# Patient Record
Sex: MALE | Race: WHITE | NOT HISPANIC OR LATINO
[De-identification: names, ages, dates, MRNs, and addresses within clinical notes are randomized per-mention and may not be internally consistent; named-entity substitution may affect disease eponyms.]

---

## 2017-07-24 PROBLEM — Z00.00 ENCOUNTER FOR PREVENTIVE HEALTH EXAMINATION: Status: ACTIVE | Noted: 2017-07-24

## 2017-10-26 ENCOUNTER — APPOINTMENT (OUTPATIENT)
Dept: ENDOCRINOLOGY | Facility: CLINIC | Age: 82
End: 2017-10-26
Payer: MEDICARE

## 2017-10-26 VITALS
DIASTOLIC BLOOD PRESSURE: 67 MMHG | HEART RATE: 68 BPM | TEMPERATURE: 98 F | BODY MASS INDEX: 23.62 KG/M2 | SYSTOLIC BLOOD PRESSURE: 114 MMHG | OXYGEN SATURATION: 100 % | HEIGHT: 77 IN | WEIGHT: 200 LBS

## 2017-10-26 DIAGNOSIS — Z82.0 FAMILY HISTORY OF EPILEPSY AND OTHER DISEASES OF THE NERVOUS SYSTEM: ICD-10-CM

## 2017-10-26 DIAGNOSIS — Z80.8 FAMILY HISTORY OF MALIGNANT NEOPLASM OF OTHER ORGANS OR SYSTEMS: ICD-10-CM

## 2017-10-26 DIAGNOSIS — Z82.49 FAMILY HISTORY OF ISCHEMIC HEART DISEASE AND OTHER DISEASES OF THE CIRCULATORY SYSTEM: ICD-10-CM

## 2017-10-26 DIAGNOSIS — G61.81 CHRONIC INFLAMMATORY DEMYELINATING POLYNEURITIS: ICD-10-CM

## 2017-10-26 DIAGNOSIS — Z87.442 PERSONAL HISTORY OF URINARY CALCULI: ICD-10-CM

## 2017-10-26 DIAGNOSIS — G20 PARKINSON'S DISEASE: ICD-10-CM

## 2017-10-26 DIAGNOSIS — Z80.6 FAMILY HISTORY OF LEUKEMIA: ICD-10-CM

## 2017-10-26 PROCEDURE — 99214 OFFICE O/P EST MOD 30 MIN: CPT

## 2017-10-27 PROBLEM — G61.81 CIDP (CHRONIC INFLAMMATORY DEMYELINATING POLYNEUROPATHY): Status: ACTIVE | Noted: 2017-10-27

## 2017-10-27 PROBLEM — G20 PARKINSON DISEASE, SYMPTOMATIC: Status: ACTIVE | Noted: 2017-10-27

## 2017-10-28 PROBLEM — Z80.6 FAMILY HISTORY OF LEUKEMIA: Status: ACTIVE | Noted: 2017-10-28

## 2017-10-28 PROBLEM — Z87.442 HISTORY OF NEPHROLITHIASIS: Status: RESOLVED | Noted: 2017-10-28 | Resolved: 2017-10-28

## 2017-10-28 PROBLEM — Z82.49 FAMILY HISTORY OF CORONARY ARTERY DISEASE: Status: ACTIVE | Noted: 2017-10-28

## 2017-10-28 PROBLEM — Z82.0 FAMILY HISTORY OF PERIPHERAL NEUROPATHY: Status: ACTIVE | Noted: 2017-10-28

## 2017-10-28 PROBLEM — Z80.8 FAMILY HISTORY OF MALIGNANT MELANOMA: Status: ACTIVE | Noted: 2017-10-28

## 2017-10-28 RX ORDER — CHLORHEXIDINE GLUCONATE 4 %
1000 LIQUID (ML) TOPICAL
Refills: 0 | Status: ACTIVE | COMMUNITY
Start: 2017-10-28

## 2017-10-28 RX ORDER — EPINEPHRINE 0.3 MG/.3ML
0.3 INJECTION INTRAMUSCULAR
Qty: 2 | Refills: 0 | Status: ACTIVE | COMMUNITY
Start: 2017-08-16

## 2017-10-28 RX ORDER — ASPIRIN 81 MG/1
81 TABLET ORAL
Refills: 0 | Status: ACTIVE | COMMUNITY
Start: 2017-10-28

## 2017-10-28 RX ORDER — CARBIDOPA AND LEVODOPA 25; 100 MG/1; MG/1
25-100 TABLET, EXTENDED RELEASE ORAL
Qty: 180 | Refills: 0 | Status: ACTIVE | COMMUNITY
Start: 2017-09-29

## 2018-03-14 ENCOUNTER — APPOINTMENT (OUTPATIENT)
Dept: ENDOCRINOLOGY | Facility: CLINIC | Age: 83
End: 2018-03-14
Payer: MEDICARE

## 2018-03-14 VITALS
BODY MASS INDEX: 24 KG/M2 | TEMPERATURE: 97.9 F | HEIGHT: 75 IN | WEIGHT: 193 LBS | DIASTOLIC BLOOD PRESSURE: 68 MMHG | HEART RATE: 81 BPM | OXYGEN SATURATION: 100 % | SYSTOLIC BLOOD PRESSURE: 125 MMHG

## 2018-03-14 DIAGNOSIS — G47.00 INSOMNIA, UNSPECIFIED: ICD-10-CM

## 2018-03-14 PROCEDURE — 99214 OFFICE O/P EST MOD 30 MIN: CPT

## 2018-03-25 ENCOUNTER — EMERGENCY (EMERGENCY)
Facility: HOSPITAL | Age: 83
LOS: 1 days | Discharge: HOME | End: 2018-03-25
Attending: EMERGENCY MEDICINE | Admitting: EMERGENCY MEDICINE
Payer: MEDICARE

## 2018-03-25 VITALS
DIASTOLIC BLOOD PRESSURE: 81 MMHG | SYSTOLIC BLOOD PRESSURE: 153 MMHG | TEMPERATURE: 98 F | RESPIRATION RATE: 16 BRPM | HEART RATE: 82 BPM | OXYGEN SATURATION: 98 % | WEIGHT: 190.04 LBS

## 2018-03-25 DIAGNOSIS — M54.41 LUMBAGO WITH SCIATICA, RIGHT SIDE: ICD-10-CM

## 2018-03-25 DIAGNOSIS — M54.42 LUMBAGO WITH SCIATICA, LEFT SIDE: ICD-10-CM

## 2018-03-25 DIAGNOSIS — I10 ESSENTIAL (PRIMARY) HYPERTENSION: ICD-10-CM

## 2018-03-25 LAB
APPEARANCE UR: CLEAR — SIGNIFICANT CHANGE UP
BILIRUB UR-MCNC: NEGATIVE — SIGNIFICANT CHANGE UP
COLOR SPEC: YELLOW — SIGNIFICANT CHANGE UP
DIFF PNL FLD: (no result)
GLUCOSE UR QL: NEGATIVE — SIGNIFICANT CHANGE UP
KETONES UR-MCNC: NEGATIVE — SIGNIFICANT CHANGE UP
LEUKOCYTE ESTERASE UR-ACNC: NEGATIVE — SIGNIFICANT CHANGE UP
NITRITE UR-MCNC: NEGATIVE — SIGNIFICANT CHANGE UP
PH UR: 6 — SIGNIFICANT CHANGE UP (ref 5–8)
PROT UR-MCNC: NEGATIVE MG/DL — SIGNIFICANT CHANGE UP
SP GR SPEC: >=1.03 — SIGNIFICANT CHANGE UP (ref 1–1.03)
UROBILINOGEN FLD QL: 0.2 E.U./DL — SIGNIFICANT CHANGE UP

## 2018-03-25 PROCEDURE — 99284 EMERGENCY DEPT VISIT MOD MDM: CPT

## 2018-03-25 PROCEDURE — 74176 CT ABD & PELVIS W/O CONTRAST: CPT | Mod: 26

## 2018-03-25 RX ORDER — KETOROLAC TROMETHAMINE 30 MG/ML
15 SYRINGE (ML) INJECTION ONCE
Qty: 0 | Refills: 0 | Status: DISCONTINUED | OUTPATIENT
Start: 2018-03-25 | End: 2018-03-25

## 2018-03-25 RX ORDER — KETOROLAC TROMETHAMINE 30 MG/ML
1 SYRINGE (ML) INJECTION
Qty: 6 | Refills: 0 | OUTPATIENT
Start: 2018-03-25 | End: 2018-03-27

## 2018-03-25 RX ADMIN — Medication 15 MILLIGRAM(S): at 11:34

## 2018-03-25 NOTE — ED PROVIDER NOTE - CHPI ED SYMPTOMS NEG
no difficulty bearing weight/no motor function loss/no numbness/no bowel dysfunction/no tingling/no bladder dysfunction

## 2018-03-25 NOTE — ED PROVIDER NOTE - OBJECTIVE STATEMENT
83 y/o M w/ PMH Parkinson's disease, neuropathy of BLE w/ Immune Globulin Injection 10% x2 weeks, HTN, who presents to the ED w/ bilateral lower back pain x1 week that occurred while driving. Pain is worse w/ sitting and improves w/ walking. He was seen by Chiropractor twice w/ adjustments without relief. He has also applied OTC pain patch and taken Aleve w/o relief. Denies fever, chills, trauma/injury/fall, abdominal pain, nausea, vomiting, bowel/bladder incontinence, weakness in lower extremities, numbness, tingling, saddle anesthesia. H/o back pain in the past.     Med list:   Immune Globulin injecion 10 %x2 weeks  ASA 81 QD   Caridopa/levodopa  TID  Folic acid 400 QD  Cod liver oil 250 QD  multi-vitamin QD  B-12 1000 QD  [id number]/12.5 QD  Trazodone 50 BID  Miralax

## 2018-03-25 NOTE — ED ADULT TRIAGE NOTE - CHIEF COMPLAINT QUOTE
Patient c/o localized lower back pain since last week. Has gone to chiropractor, taken Aleve (last dose 8pm last night), OTC patches without relief. Hx of Parkinsons and neuropathy. Denies injury/falls, lifting heavy object, loss of urinary/bowel function.

## 2018-05-24 ENCOUNTER — EMERGENCY (EMERGENCY)
Facility: HOSPITAL | Age: 83
LOS: 1 days | Discharge: HOME | End: 2018-05-24
Admitting: EMERGENCY MEDICINE
Payer: MEDICARE

## 2018-05-24 VITALS
SYSTOLIC BLOOD PRESSURE: 159 MMHG | OXYGEN SATURATION: 98 % | TEMPERATURE: 98 F | DIASTOLIC BLOOD PRESSURE: 83 MMHG | HEART RATE: 89 BPM | RESPIRATION RATE: 17 BRPM

## 2018-05-24 DIAGNOSIS — H26.40 UNSPECIFIED SECONDARY CATARACT: Chronic | ICD-10-CM

## 2018-05-24 LAB
ALBUMIN SERPL ELPH-MCNC: 3.3 G/DL — LOW (ref 3.4–5)
ALP SERPL-CCNC: 63 U/L — SIGNIFICANT CHANGE UP (ref 40–120)
ALT FLD-CCNC: 12 U/L — SIGNIFICANT CHANGE UP (ref 12–42)
ANION GAP SERPL CALC-SCNC: 6 MMOL/L — LOW (ref 9–16)
AST SERPL-CCNC: 19 U/L — SIGNIFICANT CHANGE UP (ref 15–37)
BASOPHILS NFR BLD AUTO: 0.3 % — SIGNIFICANT CHANGE UP (ref 0–2)
BILIRUB SERPL-MCNC: 0.3 MG/DL — SIGNIFICANT CHANGE UP (ref 0.2–1.2)
BUN SERPL-MCNC: 22 MG/DL — SIGNIFICANT CHANGE UP (ref 7–23)
CALCIUM SERPL-MCNC: 8.8 MG/DL — SIGNIFICANT CHANGE UP (ref 8.5–10.5)
CHLORIDE SERPL-SCNC: 105 MMOL/L — SIGNIFICANT CHANGE UP (ref 96–108)
CO2 SERPL-SCNC: 31 MMOL/L — SIGNIFICANT CHANGE UP (ref 22–31)
CREAT SERPL-MCNC: 1.3 MG/DL — SIGNIFICANT CHANGE UP (ref 0.5–1.3)
EOSINOPHIL NFR BLD AUTO: 2.2 % — SIGNIFICANT CHANGE UP (ref 0–6)
GLUCOSE SERPL-MCNC: 160 MG/DL — HIGH (ref 70–99)
HCT VFR BLD CALC: 32.9 % — LOW (ref 39–50)
HGB BLD-MCNC: 10.7 G/DL — LOW (ref 13–17)
IMM GRANULOCYTES NFR BLD AUTO: 0.3 % — SIGNIFICANT CHANGE UP (ref 0–1.5)
LYMPHOCYTES # BLD AUTO: 18.5 % — SIGNIFICANT CHANGE UP (ref 13–44)
MCHC RBC-ENTMCNC: 29.9 PG — SIGNIFICANT CHANGE UP (ref 27–34)
MCHC RBC-ENTMCNC: 32.5 G/DL — SIGNIFICANT CHANGE UP (ref 32–36)
MCV RBC AUTO: 91.9 FL — SIGNIFICANT CHANGE UP (ref 80–100)
MONOCYTES NFR BLD AUTO: 10.2 % — SIGNIFICANT CHANGE UP (ref 2–14)
NEUTROPHILS NFR BLD AUTO: 68.5 % — SIGNIFICANT CHANGE UP (ref 43–77)
PLATELET # BLD AUTO: 130 K/UL — LOW (ref 150–400)
POTASSIUM SERPL-MCNC: 3.7 MMOL/L — SIGNIFICANT CHANGE UP (ref 3.5–5.3)
POTASSIUM SERPL-SCNC: 3.7 MMOL/L — SIGNIFICANT CHANGE UP (ref 3.5–5.3)
PROT SERPL-MCNC: 8.7 G/DL — HIGH (ref 6.4–8.2)
RBC # BLD: 3.58 M/UL — LOW (ref 4.2–5.8)
RBC # FLD: 13.2 % — SIGNIFICANT CHANGE UP (ref 10.3–16.9)
SODIUM SERPL-SCNC: 142 MMOL/L — SIGNIFICANT CHANGE UP (ref 132–145)
TROPONIN I SERPL-MCNC: 0.02 NG/ML — SIGNIFICANT CHANGE UP (ref 0.02–0.06)
WBC # BLD: 3.6 K/UL — LOW (ref 3.8–10.5)
WBC # FLD AUTO: 3.6 K/UL — LOW (ref 3.8–10.5)

## 2018-05-24 PROCEDURE — 93010 ELECTROCARDIOGRAM REPORT: CPT

## 2018-05-24 PROCEDURE — 71046 X-RAY EXAM CHEST 2 VIEWS: CPT | Mod: 26

## 2018-05-24 PROCEDURE — 99285 EMERGENCY DEPT VISIT HI MDM: CPT | Mod: 25

## 2018-05-24 NOTE — ED ADULT TRIAGE NOTE - CHIEF COMPLAINT QUOTE
patient here with dizziness and lightheadedness for "the past couple of days"; ' I feel like I have tunnel vision sometimes uptight/anxious"; worsening today and yesterday; no new medicines; took trazodone 50mg 30 minutes before coming to ED; hx Parkinson's disease

## 2018-05-24 NOTE — ED ADULT NURSE NOTE - CHPI ED SYMPTOMS NEG
no cough/no shortness of breath/no vomiting/no chest pain/no nausea/no diaphoresis/no back pain/no chills/no fever/no syncope/no dizziness

## 2018-05-24 NOTE — ED ADULT NURSE NOTE - OBJECTIVE STATEMENT
84 yo M c.o dizziness and "not right feeling" since AM. Pt states "at 930 I had the infusion for my parkinson's and have felt uptight since.  This is the 70th treatment that I have had.  I felt like I needed to get out of the apartment so we went outside to finish it. I finished the infusion around 4pm but have felt lousy since. I recently was worked up for cancer but they told me I didn't have it."  Pt is A&Ox3 at this time and states "my sweetheart thinks its anxiety but I don't know". Pt stable at this time and will continue to monitor.

## 2018-05-25 VITALS
DIASTOLIC BLOOD PRESSURE: 73 MMHG | RESPIRATION RATE: 16 BRPM | SYSTOLIC BLOOD PRESSURE: 134 MMHG | HEART RATE: 76 BPM | TEMPERATURE: 98 F | OXYGEN SATURATION: 98 %

## 2018-05-25 RX ORDER — SODIUM CHLORIDE 9 MG/ML
1000 INJECTION INTRAMUSCULAR; INTRAVENOUS; SUBCUTANEOUS ONCE
Qty: 0 | Refills: 0 | Status: COMPLETED | OUTPATIENT
Start: 2018-05-25 | End: 2018-05-25

## 2018-05-25 RX ADMIN — SODIUM CHLORIDE 1000 MILLILITER(S): 9 INJECTION INTRAMUSCULAR; INTRAVENOUS; SUBCUTANEOUS at 00:39

## 2018-05-25 NOTE — ED PROVIDER NOTE - OBJECTIVE STATEMENT
82yo M with h/o Parkinsons, anemia, renal cysts, HTN presents with fatigue and feeling "claustrophobic" all day, possibly as a result of his elevator being broken and being stuck in the apartment all day, per pt. Pt also describes intermittent episodes of lightheadedness and blurry vision when standing up from sleeping in the morning, which has been ongoing x several weeks. pt evaluated 2 weeks ago for r/o cancer work up with negative results. pt also had L cataract surgery 2 weeks ago. Pt denies chest pain, palpitations, headache, current vision changes, neck pain, back pain, nausea, vomiting, abd pain, fever, chills, cough, any other symptoms. symptoms have been constant today.

## 2018-05-25 NOTE — ED PROVIDER NOTE - MEDICAL DECISION MAKING DETAILS
84yo M h/o parkinsons presents with fatigue and "claustrophobia" all day today. labs nonacute. CXR nonacute. EKG unremarkable. pt refused head CT, discussed risk of bleeding or stroke as cause of symptoms but pt had recent scan 2 weeks ago and has not fallen since then. feeling improved after fluids. requesting discharge.

## 2018-05-28 DIAGNOSIS — R42 DIZZINESS AND GIDDINESS: ICD-10-CM

## 2018-05-28 DIAGNOSIS — Z79.899 OTHER LONG TERM (CURRENT) DRUG THERAPY: ICD-10-CM

## 2018-05-28 DIAGNOSIS — R53.83 OTHER FATIGUE: ICD-10-CM

## 2018-05-28 DIAGNOSIS — I10 ESSENTIAL (PRIMARY) HYPERTENSION: ICD-10-CM

## 2018-07-12 ENCOUNTER — MEDICATION RENEWAL (OUTPATIENT)
Age: 83
End: 2018-07-12

## 2018-08-22 ENCOUNTER — APPOINTMENT (OUTPATIENT)
Dept: ENDOCRINOLOGY | Facility: CLINIC | Age: 83
End: 2018-08-22
Payer: MEDICARE

## 2018-08-22 VITALS
WEIGHT: 182 LBS | OXYGEN SATURATION: 98 % | TEMPERATURE: 97.9 F | DIASTOLIC BLOOD PRESSURE: 69 MMHG | HEIGHT: 75 IN | BODY MASS INDEX: 22.63 KG/M2 | HEART RATE: 91 BPM | SYSTOLIC BLOOD PRESSURE: 111 MMHG

## 2018-08-22 PROBLEM — I10 ESSENTIAL (PRIMARY) HYPERTENSION: Chronic | Status: ACTIVE | Noted: 2018-03-25

## 2018-08-22 PROBLEM — G20 PARKINSON'S DISEASE: Chronic | Status: ACTIVE | Noted: 2018-03-25

## 2018-08-22 PROBLEM — G57.90 UNSPECIFIED MONONEUROPATHY OF UNSPECIFIED LOWER LIMB: Chronic | Status: ACTIVE | Noted: 2018-03-25

## 2018-08-22 PROCEDURE — 99214 OFFICE O/P EST MOD 30 MIN: CPT

## 2018-09-10 ENCOUNTER — EMERGENCY (EMERGENCY)
Facility: HOSPITAL | Age: 83
LOS: 1 days | Discharge: HOME | End: 2018-09-10
Admitting: EMERGENCY MEDICINE
Payer: MEDICARE

## 2018-09-10 VITALS
TEMPERATURE: 98 F | DIASTOLIC BLOOD PRESSURE: 75 MMHG | HEART RATE: 95 BPM | SYSTOLIC BLOOD PRESSURE: 137 MMHG | OXYGEN SATURATION: 98 % | RESPIRATION RATE: 16 BRPM

## 2018-09-10 DIAGNOSIS — Z87.891 PERSONAL HISTORY OF NICOTINE DEPENDENCE: ICD-10-CM

## 2018-09-10 DIAGNOSIS — N39.0 URINARY TRACT INFECTION, SITE NOT SPECIFIED: ICD-10-CM

## 2018-09-10 DIAGNOSIS — Z79.899 OTHER LONG TERM (CURRENT) DRUG THERAPY: ICD-10-CM

## 2018-09-10 DIAGNOSIS — I10 ESSENTIAL (PRIMARY) HYPERTENSION: ICD-10-CM

## 2018-09-10 DIAGNOSIS — Z79.82 LONG TERM (CURRENT) USE OF ASPIRIN: ICD-10-CM

## 2018-09-10 DIAGNOSIS — H26.40 UNSPECIFIED SECONDARY CATARACT: Chronic | ICD-10-CM

## 2018-09-10 DIAGNOSIS — R35.0 FREQUENCY OF MICTURITION: ICD-10-CM

## 2018-09-10 LAB
APPEARANCE UR: CLEAR — SIGNIFICANT CHANGE UP
BILIRUB UR-MCNC: NEGATIVE — SIGNIFICANT CHANGE UP
COLOR SPEC: YELLOW — SIGNIFICANT CHANGE UP
DIFF PNL FLD: ABNORMAL
GLUCOSE UR QL: NEGATIVE — SIGNIFICANT CHANGE UP
KETONES UR-MCNC: NEGATIVE — SIGNIFICANT CHANGE UP
LEUKOCYTE ESTERASE UR-ACNC: ABNORMAL
NITRITE UR-MCNC: NEGATIVE — SIGNIFICANT CHANGE UP
PH UR: 5.5 — SIGNIFICANT CHANGE UP (ref 5–8)
PROT UR-MCNC: NEGATIVE MG/DL — SIGNIFICANT CHANGE UP
SP GR SPEC: 1.02 — SIGNIFICANT CHANGE UP (ref 1–1.03)
UROBILINOGEN FLD QL: 0.2 E.U./DL — SIGNIFICANT CHANGE UP

## 2018-09-10 PROCEDURE — 99283 EMERGENCY DEPT VISIT LOW MDM: CPT

## 2018-09-10 RX ORDER — PHENAZOPYRIDINE HCL 100 MG
200 TABLET ORAL ONCE
Qty: 0 | Refills: 0 | Status: COMPLETED | OUTPATIENT
Start: 2018-09-10 | End: 2018-09-10

## 2018-09-10 RX ORDER — PHENAZOPYRIDINE HCL 100 MG
1 TABLET ORAL
Qty: 6 | Refills: 0
Start: 2018-09-10 | End: 2018-09-11

## 2018-09-10 RX ORDER — CEFUROXIME AXETIL 250 MG
1 TABLET ORAL
Qty: 14 | Refills: 0 | OUTPATIENT
Start: 2018-09-10 | End: 2018-09-16

## 2018-09-10 RX ORDER — CEFUROXIME AXETIL 250 MG
250 TABLET ORAL ONCE
Qty: 0 | Refills: 0 | Status: COMPLETED | OUTPATIENT
Start: 2018-09-10 | End: 2018-09-10

## 2018-09-10 RX ORDER — CEFUROXIME AXETIL 250 MG
1 TABLET ORAL
Qty: 14 | Refills: 0
Start: 2018-09-10 | End: 2018-09-16

## 2018-09-10 RX ADMIN — Medication 200 MILLIGRAM(S): at 19:26

## 2018-09-10 RX ADMIN — Medication 250 MILLIGRAM(S): at 19:26

## 2018-09-10 NOTE — ED PROVIDER NOTE - MEDICAL DECISION MAKING DETAILS
AFVSS at time of d/c, pt non-toxic appearing, results, ddx, and f/u plans discussed with pt at bedside, d/c'd home to f/u with PMD, strict return precautions discussed, prompt return to ER for any worsening or new sx, pt verbalized understanding. uncomplicated UTI on exam and U/A, AFVSS and non-toxic appearing, no systemic sx noted, results, ddx, and f/u plans discussed with pt at bedside, d/c'd home on course of ceftin and prompt f/u with PMD and urology, strict return precautions discussed, prompt return to ER for any worsening or new sx, pt verbalized understanding.

## 2018-09-10 NOTE — ED PROVIDER NOTE - OBJECTIVE STATEMENT
82 yo M with 82 yo M with PMHx of HTN, parkinson's dz, and neuropathy, 84 yo M with PMHx of HTN, parkinson's dz, and neuropathy, presenting c/o urinary frequency and hesitancy x 1 month, seen by PMD and endocrinologist s/p outpt labs and U/A with trace damaris and unremarkable findings 1 month ago. 84 yo M with PMHx of HTN, parkinson's dz, and neuropathy, presenting c/o urinary frequency and hesitancy x 1 month, seen by PMD and endocrinologist s/p outpt labs and U/A with trace damaris and unremarkable lab findings 1 month ago. Noted worsening sx 2d ago with slight urinary leakage and dysuria today. Denies fever, chills, hematuria, penile d/c, abdominal pain, change in bowel function, flank pain, rash, HA, dizziness, SOB, CP, palpitations, diaphoresis, cough, and malaise.

## 2018-09-10 NOTE — ED PROVIDER NOTE - PHYSICAL EXAMINATION
Gen - WDWN elderly M, NAD, comfortable and non-toxic appearing  Skin - warm, dry, intact   HEENT - AT/NC, airway patent, neck supple   CV - S1S2, R/R/R  Resp - CTAB, no r/r/w  GI - soft, ND, NT, no CVAT b/l   MS - w/w/p, no c/c/e  Neuro - AxOx3, +essential tremors, ambulatory with shuffling gait

## 2018-09-11 LAB
CULTURE RESULTS: SIGNIFICANT CHANGE UP
SPECIMEN SOURCE: SIGNIFICANT CHANGE UP

## 2018-09-11 RX ORDER — SACCHAROMYCES BOULARDII 250 MG
1 POWDER IN PACKET (EA) ORAL
Qty: 30 | Refills: 0
Start: 2018-09-11 | End: 2018-09-25

## 2018-09-21 ENCOUNTER — EMERGENCY (EMERGENCY)
Facility: HOSPITAL | Age: 83
LOS: 1 days | Discharge: HOME | End: 2018-09-21
Admitting: EMERGENCY MEDICINE
Payer: MEDICARE

## 2018-09-21 VITALS
TEMPERATURE: 98 F | DIASTOLIC BLOOD PRESSURE: 68 MMHG | RESPIRATION RATE: 16 BRPM | HEART RATE: 91 BPM | OXYGEN SATURATION: 94 % | SYSTOLIC BLOOD PRESSURE: 148 MMHG

## 2018-09-21 DIAGNOSIS — H26.40 UNSPECIFIED SECONDARY CATARACT: Chronic | ICD-10-CM

## 2018-09-21 PROCEDURE — 99282 EMERGENCY DEPT VISIT SF MDM: CPT

## 2018-09-21 NOTE — ED PROVIDER NOTE - OBJECTIVE STATEMENT
83 y.o M with PMHx of Parkinson Dz on Levodopa, presents to the ED with c/o sore throat after swallowing his meds. Pt states he cuts up his pill into 4 pieces and takes 2 pieces each. Took his medication around 2PM. First 2 pieces went down fine, but the other 2 got caught in his throat. Was able to spit it back up. 83 y.o M with PMHx of Parkinson disease on Levodopa, presents to the ED with c/o sore throat after swallowing his medications today. Pt states he cuts up his levodopa pill into 4 pieces, swallows 2 pieces at a time due to dysphagia from Parkinsons. Took his medications around 3 hours ago - he swallowed the first 2 pieces without difficulty , but the subsequent 2 got "caught" in his throat - he was able to spit it back up, c/o soreness to jaleel's apple. no drooling, no dyspnea.

## 2018-09-21 NOTE — ED ADULT NURSE NOTE - NSIMPLEMENTINTERV_GEN_ALL_ED
Implemented All Universal Safety Interventions:  [place] to call system. Call bell, personal items and telephone within reach. Instruct patient to call for assistance. Room bathroom lighting operational. Non-slip footwear when patient is off stretcher. Physically safe environment: no spills, clutter or unnecessary equipment. Stretcher in lowest position, wheels locked, appropriate side rails in place.

## 2018-09-21 NOTE — ED ADULT TRIAGE NOTE - CHIEF COMPLAINT QUOTE
Patient with h/o Parkinson dx, patient complaining of throat irration after a pill got stuck in his throat

## 2018-09-21 NOTE — ED PROVIDER NOTE - ENMT, MLM
Airway patent, Nasal mucosa clear. Mouth with normal mucosa. Throat has no vesicles, no oropharyngeal exudates and uvula is midline. No drooling. no stridor. Drinking fluids and eating crackers with no difficulty. Airway patent, Mouth with normal mucosa, no fb. Throat has no vesicles, no oropharyngeal exudates and uvula is midline. No drooling. no stridor. Drinking fluids and eating crackers with no difficulty.

## 2018-09-21 NOTE — ED PROVIDER NOTE - MEDICAL DECISION MAKING DETAILS
sore throat after taking his medications today and spitting them up, c/o sore throat, no fb sensation, no signs of airway compromise or distress on exam, tolerating PO and drinking fluids, normal vitals, return precautions discussed, will dc

## 2018-09-21 NOTE — ED ADULT NURSE NOTE - OBJECTIVE STATEMENT
Pt c/o throat discomfort after he regurgitated a pill he was taking. Breathing even and unlabored. Speech clear

## 2018-09-25 DIAGNOSIS — J02.9 ACUTE PHARYNGITIS, UNSPECIFIED: ICD-10-CM

## 2018-09-25 DIAGNOSIS — Z79.899 OTHER LONG TERM (CURRENT) DRUG THERAPY: ICD-10-CM

## 2018-09-25 DIAGNOSIS — R07.0 PAIN IN THROAT: ICD-10-CM

## 2018-09-25 DIAGNOSIS — I10 ESSENTIAL (PRIMARY) HYPERTENSION: ICD-10-CM

## 2018-09-25 DIAGNOSIS — Z79.82 LONG TERM (CURRENT) USE OF ASPIRIN: ICD-10-CM

## 2018-11-25 ENCOUNTER — EMERGENCY (EMERGENCY)
Facility: HOSPITAL | Age: 83
LOS: 1 days | Discharge: HOME | End: 2018-11-25
Admitting: EMERGENCY MEDICINE
Payer: MEDICARE

## 2018-11-25 VITALS
DIASTOLIC BLOOD PRESSURE: 79 MMHG | TEMPERATURE: 98 F | OXYGEN SATURATION: 98 % | RESPIRATION RATE: 20 BRPM | SYSTOLIC BLOOD PRESSURE: 138 MMHG | HEART RATE: 89 BPM

## 2018-11-25 DIAGNOSIS — H26.40 UNSPECIFIED SECONDARY CATARACT: Chronic | ICD-10-CM

## 2018-11-25 PROCEDURE — 72100 X-RAY EXAM L-S SPINE 2/3 VWS: CPT | Mod: 26

## 2018-11-25 PROCEDURE — 99283 EMERGENCY DEPT VISIT LOW MDM: CPT

## 2018-11-25 RX ORDER — OXYCODONE AND ACETAMINOPHEN 5; 325 MG/1; MG/1
1 TABLET ORAL ONCE
Qty: 0 | Refills: 0 | Status: DISCONTINUED | OUTPATIENT
Start: 2018-11-25 | End: 2018-11-25

## 2018-11-25 RX ORDER — TRAMADOL HYDROCHLORIDE 50 MG/1
1 TABLET ORAL
Qty: 24 | Refills: 0
Start: 2018-11-25 | End: 2018-11-28

## 2018-11-25 RX ORDER — IBUPROFEN 200 MG
600 TABLET ORAL ONCE
Qty: 0 | Refills: 0 | Status: COMPLETED | OUTPATIENT
Start: 2018-11-25 | End: 2018-11-25

## 2018-11-25 RX ADMIN — OXYCODONE AND ACETAMINOPHEN 1 TABLET(S): 5; 325 TABLET ORAL at 16:51

## 2018-11-25 RX ADMIN — OXYCODONE AND ACETAMINOPHEN 1 TABLET(S): 5; 325 TABLET ORAL at 18:39

## 2018-11-25 RX ADMIN — Medication 600 MILLIGRAM(S): at 18:39

## 2018-11-25 RX ADMIN — Medication 600 MILLIGRAM(S): at 16:50

## 2018-11-25 NOTE — ED PROVIDER NOTE - OBJECTIVE STATEMENT
84 y/o male with PMHx of Parkinson's (taking carbidopa), and peripheral neuropathy with NKDA presents to the ED with complaints of lower back pain today. Pt states he has had this pain in the past and typically will see his chiropractor which helps diminishes the pain. Pt visited his chiropractor last week twice. He has taken Advil PM for the pain but with no relief. He has difficulty walking secondary to pain. Pt denies any injury or trauma to lower back. No chest pain, no SOB, no difficulty urinating, no lower extremity weakness, and no bladder dysfunction.

## 2018-11-25 NOTE — ED ADULT NURSE NOTE - NSIMPLEMENTINTERV_GEN_ALL_ED
Implemented All Universal Safety Interventions:  [place] to call system. Call bell, personal items and telephone within reach. Instruct patient to call for assistance. Room bathroom lighting operational. Non-slip footwear when patient is off stretcher. Physically safe environment: no spills, clutter or unnecessary equipment. Stretcher in lowest position, wheels locked, appropriate side rails in place.

## 2018-11-25 NOTE — ED PROVIDER NOTE - CARE PROVIDER_API CALL
Ian Macias), Orthopaedic Surgery  [address]  [address] [place] [address]  Phone: [phone]  Fax: [phone]

## 2018-11-25 NOTE — ED ADULT TRIAGE NOTE - CHIEF COMPLAINT QUOTE
ambulates into ed complaining of back pain.  has history of chronic back pain, but states that this pain is more intense and restricts movement.

## 2018-11-29 DIAGNOSIS — M54.5 LOW BACK PAIN: ICD-10-CM

## 2018-11-29 DIAGNOSIS — I10 ESSENTIAL (PRIMARY) HYPERTENSION: ICD-10-CM

## 2018-11-29 DIAGNOSIS — Z79.82 LONG TERM (CURRENT) USE OF ASPIRIN: ICD-10-CM

## 2018-11-29 DIAGNOSIS — G89.29 OTHER CHRONIC PAIN: ICD-10-CM

## 2018-11-29 DIAGNOSIS — Z79.899 OTHER LONG TERM (CURRENT) DRUG THERAPY: ICD-10-CM

## 2018-12-19 ENCOUNTER — APPOINTMENT (OUTPATIENT)
Dept: ENDOCRINOLOGY | Facility: CLINIC | Age: 83
End: 2018-12-19
Payer: MEDICARE

## 2018-12-19 VITALS
WEIGHT: 175 LBS | HEIGHT: 75 IN | BODY MASS INDEX: 21.76 KG/M2 | HEART RATE: 68 BPM | DIASTOLIC BLOOD PRESSURE: 74 MMHG | OXYGEN SATURATION: 100 % | SYSTOLIC BLOOD PRESSURE: 127 MMHG

## 2018-12-19 DIAGNOSIS — F41.9 ANXIETY DISORDER, UNSPECIFIED: ICD-10-CM

## 2018-12-19 PROCEDURE — 99214 OFFICE O/P EST MOD 30 MIN: CPT

## 2018-12-19 RX ORDER — ESCITALOPRAM OXALATE 10 MG/1
10 TABLET ORAL DAILY
Qty: 90 | Refills: 3 | Status: ACTIVE | COMMUNITY
Start: 2018-12-19

## 2018-12-19 RX ORDER — IMMUNE GLOBULIN (HUMAN) 10 G/100ML
10 INJECTION INTRAVENOUS; SUBCUTANEOUS
Refills: 0 | Status: ACTIVE | COMMUNITY
Start: 2017-10-28

## 2018-12-20 PROBLEM — F41.9 ANXIETY DISORDER: Status: ACTIVE | Noted: 2018-08-24

## 2019-04-15 NOTE — ED ADULT NURSE NOTE - FALL HARM RISK TYPE OF ASSESSMENT
Spoke to patient, advised stress test results not available yet. Advised will advise of stress test results and if Dr. Mccarthy has cleared him to give blood once reviewed with Dr. Mccarthy. He verbalized understanding and agreement to all     Admission

## 2019-04-18 ENCOUNTER — APPOINTMENT (OUTPATIENT)
Dept: ENDOCRINOLOGY | Facility: CLINIC | Age: 84
End: 2019-04-18
Payer: MEDICARE

## 2019-04-18 VITALS
OXYGEN SATURATION: 99 % | HEIGHT: 75 IN | SYSTOLIC BLOOD PRESSURE: 115 MMHG | BODY MASS INDEX: 24.37 KG/M2 | HEART RATE: 76 BPM | TEMPERATURE: 97.8 F | WEIGHT: 196 LBS | DIASTOLIC BLOOD PRESSURE: 63 MMHG

## 2019-04-18 PROCEDURE — 99214 OFFICE O/P EST MOD 30 MIN: CPT

## 2019-04-18 RX ORDER — CARBIDOPA AND LEVODOPA 25; 100 MG/1; MG/1
25-100 TABLET ORAL 3 TIMES DAILY
Refills: 0 | Status: ACTIVE | COMMUNITY
Start: 2017-04-30

## 2019-04-18 RX ORDER — ALPRAZOLAM 0.5 MG/1
0.5 TABLET ORAL
Refills: 0 | Status: ACTIVE | COMMUNITY
Start: 2018-12-19

## 2019-04-19 NOTE — HISTORY OF PRESENT ILLNESS
[FreeTextEntry1] : 84-year-old man who is followed for type 2 DM and hypertension.  His diabetes was diagnosed in 2016 when his A1c level (which was being followed because of pre-diabetes) christoph to 6.7%.  HIs glycemic control has been satisfactory on dietary modification alone.  He is also followed for a non-toxic multinodular goiter (with the dominant nodules having been negative on biopsy) and hypercholesterolemia (previously but not currently on statin therapy).  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 Saw Dr. Ricardo last month, strength (on static testing) was apparently excellent\par --Sinemet was increased to 2 tablets 3X/day at the visit to his Parkinsons physician yesterday, mostly because of worsening tremor\par --Has fallen twice in the street--says that on each occasion his leg "gave out"\par --Will be starting PT at [facility]\par Has not been testing any fingersticks, but tests done by the infusion nurse (usually fasting) have been < 110 \alexandrea Has gained back 20 lb in weight, but cannot say what brought his appetite back--seems that he decided that he "needed to eat."\par --Breakfast is either cold cereal or toast\par --Has half a sandwich or crackers/cheese for lunch, usually with a can of Ensure\par --Supper is pasta 5 nights/week, usually with chicken and vegetables.\par --Having cookies or a cupcake at night\par --Has been drinking OJ at night (because of dry mouth)\alexandrea Had a cataract extraction in the R eye in February.  (Left eye had been done in November)  Visual acuity is markedly better\alexandrea Has almost no paresthesias or neuropathic pain in his feet\alexandrea Had cataract extr

## 2019-04-19 NOTE — ASSESSMENT
[FreeTextEntry1] : 1) Type 2 DM:  Glycemic control is excellent by A1c level, and his control has not "suffered" from his having gained back the 20 lb of weight which he had lost.\par --Has not been doing fingersticks as previously encouraged, but is clearly resistant to doing this.  With the A1c level this low, will not "push the issue" at this point.\par --Diet is acceptable despite the lapses at night with what is likely excessive pasta at supper and the baked goods after supper.  Will encourage him to eliminate the latter at whatever point his A1c levels begin to rise.  \par Also suggested that he switch his supplement from Ensure to Glucerna (which is sugar-free)\par \par 2) Hypercholesterolemia:  LDL-cholesterol remains below his target of 70 mg% without statin therapy.\par --Though guidelines suggest statins for all diabetics regardless of lipid levels, will hold off for now given his multiple medications\par \par 3) Hypertension:  BP is excellent on today's exam and on the readings checked by his infusion nurses at home.\par --continue the half tablet of lisinopril/HCTZ\par \par 4) Multinodular goiter:  Stable by palpation.  Biopsies of the clinically significant nodules in the past were negative.  Will need to find out where his last ultrasounds were done before sending him for a follow-up study.\par \par See for follow-up in 4 months.  CMP, lipids, A1c, 25-D, microalb and TFTs before the visit [FreeTextEntry2] : Diet

## 2019-04-19 NOTE — REVIEW OF SYSTEMS
[Dry Skin] : dry skin [Poor Balance] : poor balance [Difficulty Walking] : difficulty walking [Insomnia] : insomnia [Fatigue] : no fatigue [Fever] : no fever [Blurry Vision] : no blurred vision [Chills] : no chills [Eyes Itch] : no itching of the eyes [Nasal Congestion] : no nasal congestion [Dry Eyes] : no dryness of the eyes [Chest Pain] : no chest pain [Hearing Loss] : no hearing loss  [Lower Ext Edema] : no lower extremity edema [Palpitations] : no palpitations [Shortness Of Breath] : no shortness of breath [Wheezing] : no wheezing was heard [Cough] : no cough [SOB on Exertion] : no shortness of breath during exertion [Nausea] : no nausea [Heartburn] : no heartburn [Joint Stiffness] : no joint stiffness [Dysuria] : no dysuria [Polyuria] : no polyuria [Myalgia] : no myalgia  [Muscle Cramps] : no muscle cramps [Headache] : no headaches [Ulcer] : no ulcer [Anxiety] : no anxiety [Depression] : no depression [Polydipsia] : no polydipsia [Stress] : no stress [Heat Intolerance] : heat tolerant [Cold Intolerance] : cold tolerant [Easy Bruising] : no tendency for easy bruising [Easy Bleeding] : no ~M tendency for easy bleeding [FreeTextEntry2] : Appetite is improved, and he has gained back 20 lb in weight.   [FreeTextEntry4] : No dysphagia for solid food, but has trouble swallowing large pills.  Also complains of excessive salivation during the day (though often has dry mouth at night) [FreeTextEntry7] : Sinemet-induced constipation adequately controlled with daily Miralax [de-identified] : Taking Xanax at bedtime most nights.  Has not had any recent panic symptoms [de-identified] : Tremor has likely worsened, but he has not yet started the increased Sinemet dose.  Has had minimal sensory symptoms in his feet [FreeTextEntry8] : Nocturia X 2, but usually because he sleeps poorly and is awake frequently during the night

## 2019-04-19 NOTE — DATA REVIEWED
[FreeTextEntry1] : [facility]  (4/15/19)\par \par ,  A1c 5.9%\par CMP WNL\par LDL 65, HDL 51, TG 36\par 25-D level satisfactory at 35 ng/ml

## 2019-04-19 NOTE — PHYSICAL EXAM
[Alert] : alert [Well Nourished] : well nourished [EOMI] : extra ocular movement intact [Healthy Appearance] : healthy appearance [No Proptosis] : no proptosis [No Lid Lag] : no lid lag [Normal Outer Ear/Nose] : the ears and nose were normal in appearance [No Neck Mass] : no neck mass was observed [Normal Hearing] : hearing was normal [Clear to Auscultation] : lungs were clear to auscultation bilaterally [No LAD] : no lymphadenopathy [Normal Rate] : heart rate was normal  [Regular Rhythm] : with a regular rhythm [No Edema] : there was no peripheral edema [Carotids Normal] : carotid pulses were normal with no bruits [Soft] : abdomen soft [Not Tender] : non-tender [Normal] : normal and non tender [No HSM] : no hepato-splenomegaly [No Joint Swelling] : no joint swelling seen [No CVA Tenderness] : no ~M costovertebral angle tenderness [Normal Strength/Tone] : muscle strength and tone were normal [No Rash] : no rash [Normal Affect] : the affect was normal [No Motor Deficits] : the motor exam was normal [Normal Mood] : the mood was normal [Gynecomastia] : no gynecomastia [Foot Ulcers] : no foot ulcers [Kyphosis] : no kyphosis present [de-identified] : Moderate corneal arcus without xanthelasma.  Pupils miotic [de-identified] : Has obviously gained back weight since his last visit [Acanthosis Nigricans] : no acanthosis nigricans [de-identified] : Thyroid approximately 30 grams in size, multinodular in consistency [de-identified] : No murmurs [de-identified] : Significant hammer-toe deformities of most of the toes, R foot > L foot [de-identified] : No cervical or supraclavicular adenopathy [de-identified] : DP pulses 3+ bilaterally [de-identified] : Moderate resting tremor of both hands, R > L.  Mild cogwheeling.  Vibratory sensation absent over the toes and malleoli.  Sensation to monofilament testing absent over both feet to the level of the ankle.  Sensation to light touch over the feet is intact.  Mild generalized bradykinesia

## 2019-06-27 ENCOUNTER — RX RENEWAL (OUTPATIENT)
Age: 84
End: 2019-06-27

## 2019-09-19 ENCOUNTER — APPOINTMENT (OUTPATIENT)
Dept: ENDOCRINOLOGY | Facility: CLINIC | Age: 84
End: 2019-09-19
Payer: MEDICARE

## 2019-09-19 VITALS
HEIGHT: 75 IN | SYSTOLIC BLOOD PRESSURE: 126 MMHG | HEART RATE: 86 BPM | WEIGHT: 190 LBS | OXYGEN SATURATION: 97 % | BODY MASS INDEX: 23.62 KG/M2 | DIASTOLIC BLOOD PRESSURE: 70 MMHG

## 2019-09-19 PROCEDURE — 99214 OFFICE O/P EST MOD 30 MIN: CPT

## 2019-09-19 RX ORDER — LISINOPRIL AND HYDROCHLOROTHIAZIDE TABLETS 20; 12.5 MG/1; MG/1
20-12.5 TABLET ORAL
Qty: 45 | Refills: 3 | Status: DISCONTINUED | COMMUNITY
Start: 2017-05-15 | End: 2019-09-19

## 2020-02-03 NOTE — ED ADULT NURSE NOTE - NS ED NURSE LEVEL OF CONSCIOUSNESS SPEECH
Speaking Coherently Quality 130: Documentation Of Current Medications In The Medical Record: Current Medications Documented Detail Level: Detailed

## 2020-02-19 ENCOUNTER — APPOINTMENT (OUTPATIENT)
Dept: ENDOCRINOLOGY | Facility: CLINIC | Age: 85
End: 2020-02-19
Payer: MEDICARE

## 2020-02-19 VITALS
TEMPERATURE: 97.6 F | SYSTOLIC BLOOD PRESSURE: 128 MMHG | HEART RATE: 81 BPM | HEIGHT: 75 IN | WEIGHT: 188 LBS | DIASTOLIC BLOOD PRESSURE: 65 MMHG | OXYGEN SATURATION: 97 % | BODY MASS INDEX: 23.38 KG/M2

## 2020-02-19 PROCEDURE — 99214 OFFICE O/P EST MOD 30 MIN: CPT

## 2020-02-19 NOTE — ED PROVIDER NOTE - NS_ATTENDINGSCRIBE_ED_ALL_ED
Duplicate request.   I personally performed the service described in the documentation recorded by the scribe in my presence, and it accurately and completely records my words and actions.

## 2020-02-21 NOTE — ED PROVIDER NOTE - DATA REVIEWED, MDM
Reason for Call:  Form, our goal is to have forms completed with 72 hours, however, some forms may require a visit or additional information.    Type of letter, form or note:  [employer]    Who is the form from?: Patient    Where did the form come from: Patient or family brought in       What clinic location was the form placed at?: [facility]    Where the form was placed: [facility] Box/Folder    What number is listed as a contact on the form?: [phone] [place]       Additional comments: Please fax to [employer] at [phone].    Call taken on 2/21/2020 at 3:48 PM by Dickson Marshall         vital signs

## 2020-02-23 NOTE — PHYSICAL EXAM
[Alert] : alert [Well Nourished] : well nourished [Normal Voice/Communication] : normal voice communication [Normal Sclera/Conjunctiva] : normal sclera/conjunctiva [PERRL] : pupils equal, round and reactive to light [EOMI] : extra ocular movement intact [No Proptosis] : no proptosis [No Lid Lag] : no lid lag [Normal Outer Ear/Nose] : the ears and nose were normal in appearance [Normal Hearing] : hearing was normal [No Neck Mass] : no neck mass was observed [No LAD] : no lymphadenopathy [Normal Rate and Effort] : normal respiratory rhythm and effort [Clear to Auscultation] : lungs were clear to auscultation bilaterally [Normal Rate] : heart rate was normal  [Regular Rhythm] : with a regular rhythm [Carotids Normal] : carotid pulses were normal with no bruits [No Edema] : there was no peripheral edema [Not Tender] : non-tender [Soft] : abdomen soft [No HSM] : no hepato-splenomegaly [Normal] : normal and non tender [No CVA Tenderness] : no ~M costovertebral angle tenderness [No Joint Swelling] : no joint swelling seen [No Rash] : no rash [Cranial Nerves Intact] : cranial nerves 2-12 were intact [Normal Affect] : the affect was normal [Normal Mood] : the mood was normal [Acanthosis Nigricans] : no acanthosis nigricans [Kyphosis] : no kyphosis present [Gynecomastia] : no gynecomastia [Foot Ulcers] : no foot ulcers [de-identified] : Mildly bradykinetic [de-identified] : Thyroid mildly enlarged, lobular in consistency [de-identified] : No cervical or supraclavicular adenopathy [de-identified] : No corneal arcus [de-identified] : DP pulses 3+ bilaterally [de-identified] : No murmurs [de-identified] : Toenails severely mycotic [de-identified] : Patchy loss of sensation to monofilament testing over the toes, worse on the left foot.  Vibratory sensation absent over the toes.  No cogwheeling [de-identified] : Mild tremor of the R hand (primarily the thumb). Multiple hammer-toe deformities.  Hip flexor and quadriceps strength 4/5 bilaterally on static testing

## 2020-02-23 NOTE — HISTORY OF PRESENT ILLNESS
[FreeTextEntry1] : 84-year-old man who is followed for type 2 DM and hypertension.  His diabetes was diagnosed in 2016 when his A1c level (which was being followed because of pre-diabetes) christoph to 6.7%.  HIs glycemic control has been satisfactory on dietary modification alone.  He is also followed for a non-toxic multinodular goiter (with the dominant nodules having been negative on biopsy) and hypercholesterolemia (previously but not currently on statin therapy).  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  Is still receiving IVIG every 3 weeks.  Has no paresthesias or neuropathic pain in his feet, but feels that his balance is worse.  Has fallen 4-5X in the past few months. Thinks that his legs are weaker, even though he has been exercising regularly--goes to the [employer] 3 days/week, does 3 miles on the stationary bike, and walks 2 miles on the track.  Says that his legs feel quite fatigued after the workout, but he does not have any significant myalgias.\par Medications reviewed:  Sinemet has not been changed since his last visit.  He remains off the baby aspirin and lisinopril.  \par Fingersticks checked randomly by the nurse who administers the IVIG have been consistently < 130-140.  She also checks his BPs, and these have been consistently below 140/80\par His tremor has been under adequate control.  He complains of "a lot of saliva," but denies any problems with drooling.  Also denies any dysphagia\par His appetite and weight are about the same.  \par --Breakfast is usually just an English muffin, occasionally a bagel\par --Lunch is any combination of fruit salad, half a sandwich (or crackers and cheese), and/or an enteral supplement (sugar-free Boost).\par --Supper is almost always pasta--usually gets pre-cooked meals from [employer]\par Complains that his vision is worse, but he has not seen Dr. Nicholson in several months.

## 2020-02-23 NOTE — REVIEW OF SYSTEMS
[Fatigue] : fatigue [Decreased Appetite] : ~L decreased appetite [Dry Skin] : dry skin [Tremors] : tremors [Poor Balance] : poor balance [Difficulty Walking] : difficulty walking [Depression] : depression [Anxiety] : anxiety [Insomnia] : insomnia [Chills] : no chills [Dry Eyes] : no dryness of the eyes [Fever] : no fever [Hearing Loss] : no hearing loss  [Eyes Itch] : no itching of the eyes [Dysphagia] : no dysphagia [Palpitations] : no palpitations [Chest Pain] : no chest pain [Lower Ext Edema] : no lower extremity edema [Cough] : no cough [Wheezing] : no wheezing was heard [Shortness Of Breath] : no shortness of breath [SOB on Exertion] : no shortness of breath during exertion [Diarrhea] : no diarrhea [Nausea] : no nausea [Polyuria] : no polyuria [Heartburn] : no heartburn [Dysuria] : no dysuria [Joint Stiffness] : no joint stiffness [Joint Pain] : no joint pain [Muscle Cramps] : no muscle cramps [Hair Loss] : no hair loss [Myalgia] : no myalgia  [Headache] : no headaches [Pain/Numbness of Digits] : no pain/numbness of digits [Stress] : no stress [Polydipsia] : no polydipsia [FreeTextEntry2] : Has lost 2 lb since his last visit [Easy Bruising] : no tendency for easy bruising [Easy Bleeding] : no ~M tendency for easy bleeding [FreeTextEntry7] : Sinemet-induced constipation is under control with Miralax [FreeTextEntry8] : Nocturia has increased to 3-4X/night [FreeTextEntry3] : See comments in the HPI regarding decreased visual acuity [de-identified] : Tremor is mostly in his R hand

## 2020-02-23 NOTE — ASSESSMENT
[FreeTextEntry1] : 1) Type 2 DM:  Glycemic control appears to be excellent as assessed by A1c level.  Fingerstick monitoring is infrequent and is being done (at random times) only by the nurse who administers his IVIG.  The only time of day when he is potentially hyperglycemic is after supper, since his starch at that meal is usually pasta, but the portion size is likely quite modest.  The major concern at this point point is actually his limited appetite and difficulty maintaining his weight.  He has lost another 2 lb, but has been overall stable during the past several months.\par --To continue off oral hypoglycemic medication.  (Will not start metformin given his excellent FBS and A1c without the drug, and would be concerned about the potential for metformin to worsen his appetite and cause weight loss. \par --Continue fingerstick testing by his IVIG nurse\par --Diet discussed.  Emphasized the need for him to continue eating lunch, and to include the sugar-free Boost\par \par 2) Hyperlipidemia:  LDL-cholesterol is well below goal without statin therapy.\par --Continue off atorvastatin\par \par 3) Hypertension:  BP is excellent at today's visit, and his readings checked by the nurse administering his IVIG have apparently been well into target range.\par --Continue off lisinopril for now.  Would be desirable to have him on the drug for diabetic nephro-protection, but he had intermittent hypotension and orthostasis on the medication\par \par Needs a follow-up ophth exam with Dr. Nicholson--his complaint of decreased vision may be due to formation of epiretinal membranes post cataract extraction.\par Also needs to see a urologist regarding his worsening nocturia\par \par See for follow-up in 4 months.  CMP, lipids, A1c, TFTs, microalb, CBC before the visit [FreeTextEntry2] : Diet (adequacy of caloric intake, not skipping lunch, etc)

## 2020-02-23 NOTE — DATA REVIEWED
[FreeTextEntry1] : [facility]  (2/14/20)\par \par ,  A1c 6.0%\par CMP WNL\par LDL 76, HDL 49, TG 39\par Vitamin B-12 level excellent at 993 pg/ml\par 25-D level in target range at 34 ng/ml\par

## 2020-04-28 NOTE — ASSESSMENT
[FreeTextEntry1] : 1) Type 2 DM:  Glycemic control remains excellent on diet alone.  Fingersticks are infrequent and only done by his IVIG nurse, but they seem to be generally < 120.  Would consider metformin therapy, but his current control is adequate and the metformin might lead to further weight loss, which is undesirable at this point.\par --Continue diet\par \par 2) Hypercholesterolemia:  LDL-cholesterol is actually only slightly above a secondary prevention target of 70 mg% without statin therapy.  Given pt's multiple meds and neuromuscular symptoms, will not restart the atorvastatin.\par \par 3) Hypertension:  BP is excellent at today's visit, and the readings taken by his IVIG nurse appear to be in target range.  \par --Will need to watch the BPs off lisinopril\par \par Suggested that he discuss his problems with "balance" with Dr. Ricardo--?? whether this is due to abnormalities of the muscle afferents from the CIDP.  (His sensation of weakness despite improved strength on static testing could also be from abnormalities in muscle afferents)\par Suggested to the pt that he stop the baby aspirin--he does not have documented vascular disease, and he is a fall risk.\par \par See for follow-up in 4 months.  CMP, lipids, A1c, microalb, B-12 and 25-D before the visit [FreeTextEntry2] : Diet

## 2020-04-28 NOTE — REVIEW OF SYSTEMS
[Fatigue] : fatigue [Decreased Appetite] : ~L decreased appetite [Muscle Weakness] : muscle weakness [Dry Skin] : dry skin [Difficulty Walking] : difficulty walking [Insomnia] : insomnia [Easy Bruising] : a tendency for easy bruising [Fever] : no fever [Chills] : no chills [Blurry Vision] : no blurred vision [Dry Eyes] : no dryness of the eyes [Eyes Itch] : no itching of the eyes [Dysphagia] : no dysphagia [Hearing Loss] : no hearing loss  [Chest Pain] : no chest pain [Palpitations] : no palpitations [Leg Claudication] : no intermittent leg claudication [Lower Ext Edema] : no lower extremity edema [Shortness Of Breath] : no shortness of breath [Wheezing] : no wheezing was heard [Nausea] : no nausea [SOB on Exertion] : no shortness of breath during exertion [Diarrhea] : no diarrhea [Heartburn] : no heartburn [Polyuria] : no polyuria [Dysuria] : no dysuria [Joint Pain] : no joint pain [Joint Stiffness] : no joint stiffness [Muscle Cramps] : no muscle cramps [Myalgia] : no myalgia  [Hair Loss] : no hair loss [Headache] : no headaches [Depression] : no depression [Stress] : no stress [Cold Intolerance] : cold tolerant [Polydipsia] : no polydipsia [Heat Intolerance] : heat tolerant [Easy Bleeding] : no ~M tendency for easy bleeding [FreeTextEntry2] : Has lost 6 lb since his last visit [FreeTextEntry4] : Recent nasal congestion from a URI [FreeTextEntry6] : Non-productive cough from his recent URI [FreeTextEntry7] : Sinemet-induced constipation has been under control with daily Miralax [FreeTextEntry8] : Nocturia 3X/night [de-identified] : See comments in the HPI re: tremor, neuropathic symptoms in is feet and "balance problems" [FreeTextEntry9] : Has had trouble getting up out of a chair, also even turning over in bed [de-identified] : Tends to get anxious in the afternoon and needs to take Xanax.  (Also takes it at night)

## 2020-04-28 NOTE — HISTORY OF PRESENT ILLNESS
[FreeTextEntry1] : 84-year-old man who is followed for type 2 DM and hypertension.  His diabetes was diagnosed in 2016 when his A1c level (which was being followed because of pre-diabetes) christoph to 6.7%.  HIs glycemic control has been satisfactory on dietary modification alone.  He is also followed for a non-toxic multinodular goiter (with the dominant nodules having been negative on biopsy) and hypercholesterolemia (previously but not currently on statin therapy).  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  \alexandrea Is still receiving IVIG every 3 weeks.  Has occasional "funny feelings" in his feet, but no other definite sensory symptoms.  Feels that his legs are somewhat weak, but that Dr. Ricardo told him that he is stronger than prior to therapy.\par He complained to his PCP Dr. Wayne about feeling weak and fatigued, and Dr. Wayne stopped his lisinopril.  His BPs (checked by the nurse who administers the IVIG) have been < 130/80.\par His Sinemet dose has not changed since his last visit.\par He continue to take Xanax in the afternoon and at bedtime.  Has not had any true panic attacks.\alexandrea Says that his appetite is "not great," and he has lost 6 lb since his last visit.  Current diet:\par --Breakfast is usually cold cereal plus a slice of toast'\par --Lunch is either fruit salad, crackers/cheese or half a sandwich\par --Main starch at supper is pasta, but says "I can't finish it."

## 2020-04-28 NOTE — PHYSICAL EXAM
[Alert] : alert [Normal Sclera/Conjunctiva] : normal sclera/conjunctiva [No Acute Distress] : no acute distress [PERRL] : pupils equal, round and reactive to light [EOMI] : extra ocular movement intact [No Proptosis] : no proptosis [No Lid Lag] : no lid lag [Normal Hearing] : hearing was normal [Normal Outer Ear/Nose] : the ears and nose were normal in appearance [No LAD] : no lymphadenopathy [Normal Rate and Effort] : normal respiratory rhythm and effort [No Neck Mass] : no neck mass was observed [Normal Rate] : heart rate was normal  [Carotids Normal] : carotid pulses were normal with no bruits [Regular Rhythm] : with a regular rhythm [No Edema] : there was no peripheral edema [Not Tender] : non-tender [Soft] : abdomen soft [No CVA Tenderness] : no ~M costovertebral angle tenderness [Normal] : normal and non tender [No HSM] : no hepato-splenomegaly [No Joint Swelling] : no joint swelling seen [No Rash] : no rash [Normal Sensation on Monofilament Testing] : normal sensation on monofilament testing of lower extremities [Normal Affect] : the affect was normal [Normal Mood] : the mood was normal [Gynecomastia] : no gynecomastia [Kyphosis] : no kyphosis present [Foot Ulcers] : no foot ulcers [Acanthosis Nigricans] : no acanthosis nigricans [de-identified] : Thyroid upper limits of normal in size, firm and lobular in consistency [de-identified] : Faint scattered rhonchi bilaterally [de-identified] : No murmurs [de-identified] : DP pulses 3+ bilaterally [de-identified] : Muscle tension precludes optimal exam [de-identified] : No cervical or supraclavicular adenopathy [de-identified] : Hammer-toe deformities of multiple toes [de-identified] : Resting tremor of both hands, R > L. Probable mild cogwheeling.  Vibratory sensation absent over the toes and malleoli.  Does not seem as bradykinetic as usual.

## 2020-04-28 NOTE — DATA REVIEWED
[FreeTextEntry1] : [facility]  (9/12/19)\par \par ,  A1c 6.0%\par CMP WNL except HCO3 33\par Urine microalb ratio negative at 14  (normal < 30)\par LDL 74, HDL 44, TG 34\par TSH level normal at 1.68 uU/ml  (0.4-4.5)\par 25-D level just into target range at 32 ng/ml

## 2020-05-26 ENCOUNTER — EMERGENCY (EMERGENCY)
Facility: HOSPITAL | Age: 85
LOS: 1 days | Discharge: HOME | End: 2020-05-26
Attending: EMERGENCY MEDICINE | Admitting: EMERGENCY MEDICINE
Payer: MEDICARE

## 2020-05-26 VITALS
RESPIRATION RATE: 18 BRPM | HEART RATE: 92 BPM | TEMPERATURE: 98 F | OXYGEN SATURATION: 95 % | DIASTOLIC BLOOD PRESSURE: 82 MMHG | WEIGHT: 179.9 LBS | SYSTOLIC BLOOD PRESSURE: 157 MMHG

## 2020-05-26 DIAGNOSIS — Z20.828 CONTACT WITH AND (SUSPECTED) EXPOSURE TO OTHER VIRAL COMMUNICABLE DISEASES: ICD-10-CM

## 2020-05-26 DIAGNOSIS — Z79.82 LONG TERM (CURRENT) USE OF ASPIRIN: ICD-10-CM

## 2020-05-26 DIAGNOSIS — Z79.1 LONG TERM (CURRENT) USE OF NON-STEROIDAL ANTI-INFLAMMATORIES (NSAID): ICD-10-CM

## 2020-05-26 DIAGNOSIS — Z79.899 OTHER LONG TERM (CURRENT) DRUG THERAPY: ICD-10-CM

## 2020-05-26 DIAGNOSIS — H26.40 UNSPECIFIED SECONDARY CATARACT: Chronic | ICD-10-CM

## 2020-05-26 PROCEDURE — 99283 EMERGENCY DEPT VISIT LOW MDM: CPT

## 2020-05-26 NOTE — ED PROVIDER NOTE - PATIENT PORTAL LINK FT
You can access the FollowMyHealth Patient Portal offered by [facility] by registering at the following website: [email or web address]. By joining [employer]’s FollowMyHealth portal, you will also be able to view your health information using other applications (apps) compatible with our system.

## 2020-05-26 NOTE — ED ADULT NURSE NOTE - NSIMPLEMENTINTERV_GEN_ALL_ED
Implemented All Universal Safety Interventions:  [place] to call system. Call bell, personal items and telephone within reach. Instruct patient to call for assistance. Room bathroom lighting operational. Non-slip footwear when patient is off stretcher. Physically safe environment: no spills, clutter or unnecessary equipment. Stretcher in lowest position, wheels locked, appropriate side rails in place.

## 2020-05-26 NOTE — ED PROVIDER NOTE - OBJECTIVE STATEMENT
85 male pt, hx of parkinsons and neuropathy, requesting covid testing. Denies any symptoms like fever, chills, cough, SOB, URI symptoms, etc. Wife also had recent testing and was negative. Has been practicing social distancing.

## 2020-05-27 LAB — SARS-COV-2 RNA SPEC QL NAA+PROBE: SIGNIFICANT CHANGE UP

## 2020-06-19 ENCOUNTER — NON-APPOINTMENT (OUTPATIENT)
Age: 85
End: 2020-06-19

## 2020-06-19 ENCOUNTER — APPOINTMENT (OUTPATIENT)
Dept: OPHTHALMOLOGY | Facility: CLINIC | Age: 85
End: 2020-06-19
Payer: MEDICARE

## 2020-06-19 PROCEDURE — 92002 INTRM OPH EXAM NEW PATIENT: CPT

## 2020-06-19 PROCEDURE — 92250 FUNDUS PHOTOGRAPHY W/I&R: CPT

## 2020-06-25 ENCOUNTER — APPOINTMENT (OUTPATIENT)
Dept: ENDOCRINOLOGY | Facility: CLINIC | Age: 85
End: 2020-06-25
Payer: MEDICARE

## 2020-06-25 VITALS
SYSTOLIC BLOOD PRESSURE: 120 MMHG | DIASTOLIC BLOOD PRESSURE: 67 MMHG | HEART RATE: 82 BPM | OXYGEN SATURATION: 98 % | TEMPERATURE: 98.3 F | BODY MASS INDEX: 23 KG/M2 | HEIGHT: 75 IN | WEIGHT: 185 LBS

## 2020-06-25 DIAGNOSIS — E04.2 NONTOXIC MULTINODULAR GOITER: ICD-10-CM

## 2020-06-25 PROCEDURE — 99214 OFFICE O/P EST MOD 30 MIN: CPT

## 2020-06-28 PROBLEM — E04.2 NON-TOXIC MULTINODULAR GOITER: Status: ACTIVE | Noted: 2017-10-27

## 2020-06-28 NOTE — PHYSICAL EXAM
[No Acute Distress] : no acute distress [Alert] : alert [Well Nourished] : well nourished [EOMI] : extra ocular movement intact [Normal Sclera/Conjunctiva] : normal sclera/conjunctiva [No Proptosis] : no proptosis [No Lid Lag] : no lid lag [Normal Outer Ear/Nose] : the ears and nose were normal in appearance [No Neck Mass] : no neck mass was observed [No LAD] : no lymphadenopathy [Clear to Auscultation] : lungs were clear to auscultation bilaterally [Normal Rate] : heart rate was normal [Regular Rhythm] : with a regular rhythm [Carotids Normal] : carotid pulses were normal with no bruits [No Edema] : no peripheral edema [Not Tender] : non-tender [No HSM] : no hepato-splenomegaly [Soft] : abdomen soft [Normal Supraclavicular Nodes] : no supraclavicular lymphadenopathy [No CVA Tenderness] : no ~M costovertebral angle tenderness [Normal Anterior Cervical Nodes] : no anterior cervical lymphadenopathy [No Joint Swelling] : no joint swelling seen [Normal Affect] : the affect was normal [Normal Mood] : the mood was normal [Kyphosis] : no kyphosis present [Foot Ulcers] : no foot ulcers [Acanthosis Nigricans] : no acanthosis nigricans [de-identified] : Mildly bradykinetic [de-identified] : Pupils miotic.  Moderate corneal arcus [de-identified] : Hearing is somewhat impaired [de-identified] : DP pulses 3+ bilaterally [de-identified] : No murmurs [de-identified] : Thyroid approximately 30 grams in size, multinodular in consistency [de-identified] : Hip flexor strength 4/5 bilaterally on static testing [de-identified] : Mild resting and intention tremor of both hands.  Vibratory sensation absent over the toes and malleoli.  Sensation to monofilament testing is essentially absent over both feet

## 2020-06-28 NOTE — ASSESSMENT
[FreeTextEntry2] : Diet [FreeTextEntry1] : 1) Type 2 DM:  Glycemic control is excellent as assessed by A1c level, even though fingerstick monitoring is limited to occasional random readings taken by his IVIG nurse.  Will need to watch for any further weight loss, as his caloric intake is still sub-optimal\par \par 2) Hypercholesterolemia:  LDL-cholesterol is only a trace above the optimal of 70 mg% without statin therapy.\par --Continue off atorvastatin\par \par 3) Hypertension:  BP is normal at today's visit, and the readings taken by the nurse during IVIG infusions have apparently been in a similar range.\par --Continue off lisinopril and HCTZ\par \par 4) Multinodular goiter:  Goiter feels stable on palpation, and TFTs remain in the normal range.  Has not had an updated ultrasound in > 2 years, and will consider repeating this later this year\par \par See for follow-up in 4 months  CMP, lipids, A1c, CBC and 25-D before the visit

## 2020-06-28 NOTE — HISTORY OF PRESENT ILLNESS
[FreeTextEntry1] : 85-year-old man who is followed for type 2 DM and hypertension.  His diabetes was diagnosed in 2016 when his A1c level (which was being followed because of pre-diabetes) christoph to 6.7%.  HIs glycemic control has been satisfactory on dietary modification alone.  He is also followed for a non-toxic multinodular goiter (with the dominant nodules having been negative on biopsy) and hypercholesterolemia (previously but not currently on statin therapy).  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  No changes in his medications were made.  His tremor is fairly well controlled.\par Still receiving IVIG every 3 weeks. Will be seeing Dr. Ricardo next week.  Has had minimal neuropathic symptoms in his feet. Thinks that his leg strength is reasonably OK, though he has difficulty getting out of a chair.\par Describes his appetite as "not great," but his weight is basically stable\par --Breakfast is either an English muffin or half a bagel\par --Lunch is usually leftovers from supper the night before\par --Protein at supper is usually poultry.  Starch is almost always pasta.  \par --Has ice cream after supper.\par --Is probably drinking too much fruit juice\par --Has an occasional beer with supper\par Fingersticks (done at random times by the nurse who administers his IVIG) have been < 120 mg%\par Saw Dr. Nicholson for an ophth exam last week.  Was told of "swelling" in his R eye and was referred to a retina specialist at [facility].  The pt notes decreased acuity in that eye, but no definite metamorphopsia\par BPs taken by the IVIG nurse have been fine.  He denies any orthostatic dizziness.

## 2020-06-28 NOTE — REVIEW OF SYSTEMS
[Fatigue] : fatigue [Dry Skin] : dry skin [Poor Balance] : poor balance [Anxiety] : anxiety [Stress] : stress [Easy Bruising] : a tendency for easy bruising [Fever] : no fever [Chills] : no chills [Dry Eyes] : no dryness [Eyes Itch] : no itch [Dysphagia] : no dysphagia [Hearing Loss] : no hearing loss  [Chest Pain] : no chest pain [Palpitations] : no palpitations [Lower Ext Edema] : no lower extremity edema [Cough] : no cough [Shortness Of Breath] : no shortness of breath [Nausea] : no nausea [Wheezing] : no wheezing [SOB on Exertion] : no shortness of breath on exertion [Diarrhea] : no diarrhea [Heartburn] : no heartburn [Dysuria] : no dysuria [Polyuria] : no polyuria [Joint Pain] : no joint pain [Myalgia] : no myalgia  [Muscle Cramps] : no muscle cramps [Joint Stiffness] : no joint stiffness [Ulcer] : no ulcer [Headaches] : no headaches [Cold Intolerance] : no cold intolerance [Depression] : no depression [Polydipsia] : no polydipsia [Heat Intolerance] : no heat intolerance [Easy Bleeding] : no ~M tendency for easy bleeding [FreeTextEntry3] : Blurred vision in his R eye (see HPI) [FreeTextEntry2] : Has lost 3 lb since his last visit.  Appetite has nonetheless been somewhat better [FreeTextEntry7] : Still on daily Miralax to deal with Sinemet-induced constipation [FreeTextEntry8] : Nocturia 2-3X/night [de-identified] : Taking Ativan in the late afternoon and at bedtime on a regular basis [FreeTextEntry9] : Feels that his legs are somewhat weak [de-identified] : Tremor is under adequate control with the Sinemet.  Paresthesias in his feet have been minimal during the past few months.  Still feels "slow" when ambulating

## 2020-06-28 NOTE — DATA REVIEWED
[FreeTextEntry1] : [facility]  (6/22/20)\par \par ,  A1c 6.1%\par CMP WNL except elevated HCO3 of 34 meq/l\par LDL 74, HDL 48, TG 38\par Urine microalbumin ratio negative at 14 (normal < 30)\par TSH level normal at 1.57 uU/ml  (0.4-4.5)

## 2020-07-01 ENCOUNTER — APPOINTMENT (OUTPATIENT)
Dept: OPHTHALMOLOGY | Facility: CLINIC | Age: 85
End: 2020-07-01
Payer: MEDICARE

## 2020-07-01 ENCOUNTER — NON-APPOINTMENT (OUTPATIENT)
Age: 85
End: 2020-07-01

## 2020-07-01 PROCEDURE — 92014 COMPRE OPH EXAM EST PT 1/>: CPT | Mod: 25

## 2020-07-01 PROCEDURE — 92235 FLUORESCEIN ANGRPH MLTIFRAME: CPT

## 2020-07-01 PROCEDURE — 92250 FUNDUS PHOTOGRAPHY W/I&R: CPT

## 2020-07-01 PROCEDURE — 67028 INJECTION EYE DRUG: CPT | Mod: RT

## 2020-07-30 ENCOUNTER — NON-APPOINTMENT (OUTPATIENT)
Age: 85
End: 2020-07-30

## 2020-07-30 ENCOUNTER — APPOINTMENT (OUTPATIENT)
Dept: OPHTHALMOLOGY | Facility: CLINIC | Age: 85
End: 2020-07-30
Payer: MEDICARE

## 2020-07-30 PROCEDURE — 92134 CPTRZ OPH DX IMG PST SGM RTA: CPT

## 2020-07-30 PROCEDURE — 67028 INJECTION EYE DRUG: CPT | Mod: RT

## 2020-08-27 ENCOUNTER — NON-APPOINTMENT (OUTPATIENT)
Age: 85
End: 2020-08-27

## 2020-08-27 ENCOUNTER — APPOINTMENT (OUTPATIENT)
Dept: OPHTHALMOLOGY | Facility: CLINIC | Age: 85
End: 2020-08-27
Payer: MEDICARE

## 2020-08-27 PROCEDURE — 67028 INJECTION EYE DRUG: CPT | Mod: RT

## 2020-08-27 PROCEDURE — 92134 CPTRZ OPH DX IMG PST SGM RTA: CPT

## 2020-09-03 NOTE — ED PROVIDER NOTE - NEURO NEGATIVE STATEMENT, MLM
19 no loss of consciousness, no gait abnormality, no headache, no sensory deficits, and no weakness.

## 2020-10-01 ENCOUNTER — APPOINTMENT (OUTPATIENT)
Dept: OPHTHALMOLOGY | Facility: CLINIC | Age: 85
End: 2020-10-01
Payer: MEDICARE

## 2020-10-01 ENCOUNTER — NON-APPOINTMENT (OUTPATIENT)
Age: 85
End: 2020-10-01

## 2020-10-01 PROCEDURE — 92012 INTRM OPH EXAM EST PATIENT: CPT | Mod: 25

## 2020-10-01 PROCEDURE — 67028 INJECTION EYE DRUG: CPT | Mod: RT

## 2020-10-01 PROCEDURE — 92134 CPTRZ OPH DX IMG PST SGM RTA: CPT

## 2020-10-29 ENCOUNTER — APPOINTMENT (OUTPATIENT)
Dept: OPHTHALMOLOGY | Facility: CLINIC | Age: 85
End: 2020-10-29
Payer: MEDICARE

## 2020-10-29 ENCOUNTER — NON-APPOINTMENT (OUTPATIENT)
Age: 85
End: 2020-10-29

## 2020-10-29 PROCEDURE — 67028 INJECTION EYE DRUG: CPT | Mod: RT

## 2020-10-29 PROCEDURE — 99072 ADDL SUPL MATRL&STAF TM PHE: CPT

## 2020-10-29 PROCEDURE — 92134 CPTRZ OPH DX IMG PST SGM RTA: CPT

## 2020-11-18 ENCOUNTER — APPOINTMENT (OUTPATIENT)
Dept: ENDOCRINOLOGY | Facility: CLINIC | Age: 85
End: 2020-11-18

## 2020-11-25 ENCOUNTER — APPOINTMENT (OUTPATIENT)
Dept: ENDOCRINOLOGY | Facility: CLINIC | Age: 85
End: 2020-11-25
Payer: MEDICARE

## 2020-11-25 VITALS
WEIGHT: 190 LBS | HEART RATE: 80 BPM | BODY MASS INDEX: 23.62 KG/M2 | DIASTOLIC BLOOD PRESSURE: 76 MMHG | HEIGHT: 75 IN | TEMPERATURE: 97.7 F | OXYGEN SATURATION: 97 % | SYSTOLIC BLOOD PRESSURE: 151 MMHG

## 2020-11-25 DIAGNOSIS — H34.8310 TRIBUTARY (BRANCH) RETINAL VEIN OCCLUSION, RIGHT EYE, WITH MACULAR EDEMA: ICD-10-CM

## 2020-11-25 PROCEDURE — 99214 OFFICE O/P EST MOD 30 MIN: CPT

## 2020-11-25 RX ORDER — LORATADINE 10 MG
17 TABLET,DISINTEGRATING ORAL DAILY
Refills: 0 | Status: DISCONTINUED | COMMUNITY
Start: 2017-10-28 | End: 2020-11-25

## 2020-11-27 ENCOUNTER — NON-APPOINTMENT (OUTPATIENT)
Age: 85
End: 2020-11-27

## 2020-11-28 PROBLEM — H34.8310 BRANCH RETINAL VEIN OCCLUSION OF RIGHT EYE WITH MACULAR EDEMA: Status: ACTIVE | Noted: 2020-11-28

## 2020-11-28 RX ORDER — KETOCONAZOLE 20 MG/G
2 CREAM TOPICAL
Qty: 60 | Refills: 0 | Status: ACTIVE | COMMUNITY
Start: 2020-11-20

## 2020-11-28 NOTE — ASSESSMENT
[FreeTextEntry1] : 1) Type 2 DM:  Glycemic control is clearly excellent by A1c level.  Pt has also gained back 5 lb in weight, which is desirable.  Random fingersticks done by the nurse who administers his IVIG have been in target range, and I will not push him to monitor on his own as long as his A1c level is this low.\par --Continue a no-concentrated-sweets diet\par --Asked him to bring a written log of the fingersticks done by his nurse to the next visit\par \par 2) Hyperlipidemia:  LDL-cholesterol level is well below his goal of 100 mg% even without statin therapy.\par \par 3) Hypertension:  Systolic BP is mildly elevated at today's visit.\par --Asked the pt to bring a log of the BP readings done by his nurse to the next visit.\par --Continue off lisinopril for now\par \par 4) Gait instability:  Etiology of this is somewhat unclear, as his leg strength by static testing seems to be only mildly increased.  The problem is presumably his neuropathy, ?? proprioception of damage to muscle afferents.\par --Pt to ask Dr. Ricardo about a referral to restart formal PT.\par \par 5) Diarrhea--somewhat surprising given his previous problems with Sinemet-induced constipation, with the Sinemet dose having not been changed.  At this point his Imodium is probably excessive, and is creating a problem  with alternating constipation and diarrhea.\par --To decrease the Imodium to once a day.\par --Suggested that he add fiber supplements, since these would increase stool bulk\par --Also suggested that he discontinue carbonated beverages, since he also complains of excessive gas with the bowel movements.\par \par See for follow-up in 4 months.  CMP, lipids, A1c, TFTs, CBC, Fe/IBC, microalb before the visit\par \par  [FreeTextEntry2] : Diet, fiber supplements, restarting PT

## 2020-11-28 NOTE — REVIEW OF SYSTEMS
[Fatigue] : fatigue [Blurred Vision] : blurred vision [Muscle Weakness] : muscle weakness [Difficulty Walking] : difficulty walking [Tremors] : tremors [Poor Balance] : poor balance [Fever] : no fever [Chills] : no chills [Dry Eyes] : no dryness [Eyes Itch] : no itch [Hearing Loss] : no hearing loss  [Chest Pain] : no chest pain [Palpitations] : no palpitations [Lower Ext Edema] : no lower extremity edema [Shortness Of Breath] : no shortness of breath [Cough] : no cough [Wheezing] : no wheezing [SOB on Exertion] : no shortness of breath on exertion [Nausea] : no nausea [Heartburn] : no heartburn [Polyuria] : no polyuria [Dysuria] : no dysuria [Joint Pain] : no joint pain [Myalgia] : no myalgia  [Joint Stiffness] : no joint stiffness [Dry Skin] : no dry skin [Ulcer] : no ulcer [Headaches] : no headaches [Depression] : no depression [Stress] : no stress [Polydipsia] : no polydipsia [Cold Intolerance] : no cold intolerance [Heat Intolerance] : no heat intolerance [Easy Bleeding] : no ~M tendency for easy bleeding [Easy Bruising] : no tendency for easy bruising [FreeTextEntry2] : Appetite has improved, and he has gained 5 lb since his last visit.  [FreeTextEntry3] : See HPI re: problems with his R eye [FreeTextEntry4] : No dysphagia for most foods, but has trouble swallowing pills [FreeTextEntry7] : See HPI re: recent bowel symptoms [FreeTextEntry8] : Nocturia 2-3X/night [de-identified] : Still with mild paresthesias and subjective numbness in his toes [de-identified] : Anxiety has been under control on Xanax plus Lexapro

## 2020-11-28 NOTE — HISTORY OF PRESENT ILLNESS
[FreeTextEntry1] : 85-year-old man who is followed for type 2 DM and hypertension.  His diabetes was diagnosed in 2016 when his A1c level (which was being followed because of pre-diabetes) christoph to 6.7%.  HIs glycemic control has been satisfactory on dietary modification alone.  He is also followed for a non-toxic multinodular goiter (with the dominant nodules having been negative on biopsy) and hypercholesterolemia (previously but not currently on statin therapy).  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  (This was "the problem" noted by Dr. Nicholson which the pt mentioned at his previous visit in June, but I was unable to ascertain the details until I was able to review Dr. Aragon's notes).  Pt has since had 4 intravitreal injections of Eylea, but without any sustained improvement.  (Notes improvement in his blurred vision for the first week or two after the injection, then worsening back to "baseline.")\par Has fallen twice in the past few weeks, blames problems with his balance  (Does not think that visual problems contributed to the falls).  Has been doing exercise walks, but the falls occurred at home--once in his living room (lost his balance when getting out of a chair) and once when he was getting into the elevator.\par Appetite has improved, and he has gained weight.\par Has been having more bowel problems.  Stopped the Miralax after he developed diarrhea, and has now started taking Imodium BID.  He now has better control of the diarrhea, but intermittently gets constipated to the point where he needs to take a Fleets enema.\par Still getting IVIG every 3 weeks.  BPs and fingersticks are taken by the nurse who administers his IVIG, and these are satisfactory.\par Current diet:\par --Breakfast is cold cereal with milk, sometimes with strawberries\par --Lunch is either leftovers or half a sandwich\par --Starch at supper is rice or pasta

## 2020-11-28 NOTE — PHYSICAL EXAM
[Alert] : alert [Well Nourished] : well nourished [No Acute Distress] : no acute distress [Normal Voice/Communication] : normal voice communication [Normal Sclera/Conjunctiva] : normal sclera/conjunctiva [EOMI] : extra ocular movement intact [PERRL] : pupils equal, round and reactive to light [No Proptosis] : no proptosis [No Lid Lag] : no lid lag [Normal Outer Ear/Nose] : the ears and nose were normal in appearance [Normal Hearing] : hearing was normal [No Neck Mass] : no neck mass was observed [No LAD] : no lymphadenopathy [Clear to Auscultation] : lungs were clear to auscultation bilaterally [No Murmurs] : no murmurs [Normal Rate] : heart rate was normal [Regular Rhythm] : with a regular rhythm [Carotids Normal] : carotid pulses were normal with no bruits [No Edema] : no peripheral edema [Not Tender] : non-tender [No HSM] : no hepato-splenomegaly [Normal Supraclavicular Nodes] : no supraclavicular lymphadenopathy [Normal Anterior Cervical Nodes] : no anterior cervical lymphadenopathy [No CVA Tenderness] : no ~M costovertebral angle tenderness [No Joint Swelling] : no joint swelling seen [No Rash] : no rash [Normal Affect] : the affect was normal [Normal Mood] : the mood was normal [Kyphosis] : no kyphosis present [Acanthosis Nigricans] : no acanthosis nigricans [Foot Ulcers] : no foot ulcers [de-identified] : Mild corneal arcus.  Perceives 1 finger held in front of his R eye as blurred, but not distorted [de-identified] : Thyroid 25-30 grams in size, lobular in consistency [de-identified] : DP pulses 3+ bilaterally  [de-identified] : Exam limited by muscle tension [de-identified] : Hip flexor and quadriceps strength 4+/5 bilaterally.  Multiple hammer toes [de-identified] : Moderate resting tremor of both hands.  Patchy loss of sensation to monofilament over the toes. Vibratory sensation moderately decreased over the toes.  Slight cogwheeling of the UEs

## 2020-11-30 ENCOUNTER — NON-APPOINTMENT (OUTPATIENT)
Age: 85
End: 2020-11-30

## 2020-11-30 ENCOUNTER — APPOINTMENT (OUTPATIENT)
Dept: OPHTHALMOLOGY | Facility: CLINIC | Age: 85
End: 2020-11-30
Payer: MEDICARE

## 2020-11-30 PROCEDURE — 67028 INJECTION EYE DRUG: CPT | Mod: RT

## 2020-11-30 PROCEDURE — 92134 CPTRZ OPH DX IMG PST SGM RTA: CPT

## 2020-11-30 PROCEDURE — 99072 ADDL SUPL MATRL&STAF TM PHE: CPT

## 2021-01-04 ENCOUNTER — NON-APPOINTMENT (OUTPATIENT)
Age: 86
End: 2021-01-04

## 2021-01-04 ENCOUNTER — APPOINTMENT (OUTPATIENT)
Dept: OPHTHALMOLOGY | Facility: CLINIC | Age: 86
End: 2021-01-04
Payer: MEDICARE

## 2021-01-04 PROCEDURE — 99072 ADDL SUPL MATRL&STAF TM PHE: CPT

## 2021-01-04 PROCEDURE — 92134 CPTRZ OPH DX IMG PST SGM RTA: CPT

## 2021-01-04 PROCEDURE — 67028 INJECTION EYE DRUG: CPT | Mod: RT

## 2021-01-04 PROCEDURE — 92012 INTRM OPH EXAM EST PATIENT: CPT | Mod: 25

## 2021-01-04 NOTE — ED PROVIDER NOTE - CROS ED ROS STATEMENT
sent in from televisit MD for further evaluation of high BP and tachycardia at home since Sunday. h/o PE, HTN, pre-diabetes. reports nicardipine has increased from 60mg to 90mg a few weeks ago. also states having nodules in his thyroid. sent in from televisit MD for further evaluation of high BP and tachycardia at home since Sunday. also c/o fatigue and lightheadedness. h/o PE, HTN, pre-diabetes. reports nicardipine has increased from 60mg to 90mg a few weeks ago. also states having nodules in his thyroid. all other ROS negative except as per HPI

## 2021-02-08 ENCOUNTER — APPOINTMENT (OUTPATIENT)
Dept: OPHTHALMOLOGY | Facility: CLINIC | Age: 86
End: 2021-02-08
Payer: MEDICARE

## 2021-02-08 ENCOUNTER — NON-APPOINTMENT (OUTPATIENT)
Age: 86
End: 2021-02-08

## 2021-02-08 PROCEDURE — 92012 INTRM OPH EXAM EST PATIENT: CPT

## 2021-02-08 PROCEDURE — 92134 CPTRZ OPH DX IMG PST SGM RTA: CPT

## 2021-02-08 PROCEDURE — 99072 ADDL SUPL MATRL&STAF TM PHE: CPT

## 2021-02-25 ENCOUNTER — APPOINTMENT (OUTPATIENT)
Dept: OPHTHALMOLOGY | Facility: CLINIC | Age: 86
End: 2021-02-25

## 2021-03-08 ENCOUNTER — APPOINTMENT (OUTPATIENT)
Dept: OPHTHALMOLOGY | Facility: CLINIC | Age: 86
End: 2021-03-08
Payer: MEDICARE

## 2021-03-08 ENCOUNTER — NON-APPOINTMENT (OUTPATIENT)
Age: 86
End: 2021-03-08

## 2021-03-08 PROCEDURE — 92134 CPTRZ OPH DX IMG PST SGM RTA: CPT

## 2021-03-08 PROCEDURE — 67028 INJECTION EYE DRUG: CPT | Mod: RT

## 2021-03-08 PROCEDURE — 99072 ADDL SUPL MATRL&STAF TM PHE: CPT

## 2021-04-16 ENCOUNTER — APPOINTMENT (OUTPATIENT)
Dept: OPHTHALMOLOGY | Facility: CLINIC | Age: 86
End: 2021-04-16
Payer: MEDICARE

## 2021-04-16 ENCOUNTER — NON-APPOINTMENT (OUTPATIENT)
Age: 86
End: 2021-04-16

## 2021-04-16 PROCEDURE — 92015 DETERMINE REFRACTIVE STATE: CPT

## 2021-04-16 PROCEDURE — 99072 ADDL SUPL MATRL&STAF TM PHE: CPT

## 2021-04-23 NOTE — ED ADULT TRIAGE NOTE - PAIN: PRESENCE, MLM
[address] Patient Status:  Hospital Outpatient Surgery   Age/Gender 3year old male MRN [id number]   Location [address] [facility] Attending Gerhard Nicholas MD   Hosp Day # 0 PCP Scott Rao MD       Anesthesia Post-
denies pain/discomfort

## 2021-04-28 ENCOUNTER — APPOINTMENT (OUTPATIENT)
Dept: ENDOCRINOLOGY | Facility: CLINIC | Age: 86
End: 2021-04-28

## 2021-05-04 ENCOUNTER — APPOINTMENT (OUTPATIENT)
Dept: ENDOCRINOLOGY | Facility: CLINIC | Age: 86
End: 2021-05-04
Payer: MEDICARE

## 2021-05-04 VITALS
DIASTOLIC BLOOD PRESSURE: 78 MMHG | SYSTOLIC BLOOD PRESSURE: 151 MMHG | BODY MASS INDEX: 23.62 KG/M2 | WEIGHT: 190 LBS | HEIGHT: 75 IN | TEMPERATURE: 97 F | OXYGEN SATURATION: 98 % | HEART RATE: 82 BPM

## 2021-05-04 DIAGNOSIS — Z87.898 PERSONAL HISTORY OF OTHER SPECIFIED CONDITIONS: ICD-10-CM

## 2021-05-04 PROCEDURE — 99072 ADDL SUPL MATRL&STAF TM PHE: CPT

## 2021-05-04 PROCEDURE — 99214 OFFICE O/P EST MOD 30 MIN: CPT

## 2021-05-06 NOTE — PHYSICAL EXAM
[Alert] : alert [Well Nourished] : well nourished [Normal Sclera/Conjunctiva] : normal sclera/conjunctiva [EOMI] : extra ocular movement intact [No Proptosis] : no proptosis [No Lid Lag] : no lid lag [Normal Outer Ear/Nose] : the ears and nose were normal in appearance [Normal Hearing] : hearing was normal [No Neck Mass] : no neck mass was observed [No LAD] : no lymphadenopathy [Clear to Auscultation] : lungs were clear to auscultation bilaterally [No Murmurs] : no murmurs [Normal Rate] : heart rate was normal [Regular Rhythm] : with a regular rhythm [Carotids Normal] : carotid pulses were normal with no bruits [No Edema] : no peripheral edema [Normal Supraclavicular Nodes] : no supraclavicular lymphadenopathy [Normal Anterior Cervical Nodes] : no anterior cervical lymphadenopathy [No CVA Tenderness] : no ~M costovertebral angle tenderness [No Joint Swelling] : no joint swelling seen [Normal Affect] : the affect was normal [Normal Mood] : the mood was normal [No Acute Distress] : no acute distress [Not Tender] : non-tender [Soft] : abdomen soft [No HSM] : no hepato-splenomegaly [Kyphosis] : no kyphosis present [Acanthosis Nigricans] : no acanthosis nigricans [Foot Ulcers] : no foot ulcers [de-identified] : Moderately bradykinetic [de-identified] : Pupils miotic.  Moderate corneal arcus [de-identified] : Thyroid mildly enlarged, firm and lobular in consistency [de-identified] : DP pulses 3+ bilaterally [de-identified] : Hip flexor and quadriceps strength 4+/5 bilaterally, but he is unable to rise from a chair without assistance.  Multiple hammer-toe deformities on both feet [de-identified] : Eschar on the R posterior calf at the site of a recent skin biopsy [de-identified] : Mild resting tremor of both hands, R > L.  Absent sensation to light touch over the dorsum of both feet to the level of the instep.  Vibratory sensation absent over the toes and malleoli

## 2021-05-06 NOTE — DATA REVIEWED
[FreeTextEntry1] : [facility]  (4/26/21)\par \par , A1c 5.8%\par CMP WNl except for elevated total globulins (secondary to IVIG therapy)\par Urine microalbumin ratio negative at 16 (normal < 30)\par LDL 81, HDL 46, TG 33\par TSH level normal at 1.25 uU/ml  (0.4-4.5)\par Hb 11.8 gm, Fe sat 23%\par B-12 level excellent at 575 pg/ml\par 25-D level in target range at 33 ng/ml

## 2021-05-06 NOTE — ASSESSMENT
[FreeTextEntry1] : 1) Type 2 DM: FBS and A1c level suggest excellent glycemic control despite the pt's diet lapses with regular soda, etc.\par --Pt was warned about the concentrated sweets\par \par 2) Hyperlipidemia:  LDL-cholesterol is below his technical target of 100 mg%.  Will hold off restarting statin therapy given his neuromuscular disease, though may need to do this if his stress test is positive.\par \par 3) Hypertension: Systolic BP is mildly elevated at today's visit, though readings checked at home by the nurse who administers his IVIG are apparently fine.\par --Pt to ask the nurse at his next infusion on Friday to see if she can figure out the problem with their BP machine--otherwise they will buy a new one.  Pt needs to monitor once a week at home\par \par 4) Chest pain:  His symptoms are somewhat indistinct but he did mention "chest tightness" on each of three occasions when I reviewed the symptoms he was having when walking home from the gym.  Should likely have a stress test.  Says that he had one a few years ago, but does not remember when.  Thought that it was at [facility] but he does not remember the name of the physician.\par --I spoke to his wife after the visit.  She also does not remember the name of the physician, but will see if Dr. Good can see him or the staff can find out who he previously saw.\par \par See for follow-up in 4 months.  CMP, lipids, A1c, CBC, Fe/IBC before the visit [FreeTextEntry2] : Diet, home BP monitoring and targets

## 2021-05-06 NOTE — HISTORY OF PRESENT ILLNESS
[FreeTextEntry1] : 86-year-old man who is followed for type 2 DM and hypertension.  His diabetes was diagnosed in 2016 when his A1c level (which was being followed because of pre-diabetes) christoph to 6.7%.  HIs glycemic control has been satisfactory on dietary modification alone.  He is also followed for a non-toxic multinodular goiter (with the dominant nodules having been negative on biopsy) and hypercholesterolemia (previously but not currently on statin therapy).  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  (Dr. Aragon at [facility]).  Describes floaters and "fogginess" in that eye, as well as what seems to be mild metamorphopsia.  The latter improves partially after the injections.\par --Received both doses of (Moderna) COVID vaccine.  Had myalgias and fatigue after the second injection.\par --Still seeing Dr. Morrow every 3 months for follow-up of his Parkinsons--no medication changes were made at his last visit. Has fallen twice in the past 3 weeks.  Still goes to the gym ([employer]) 3 days/week--does 20-25 min on an exercise bike, then a half-mile walk on the indoor track.  Describes CERVANTES and also chest tightness when walking home from the gym\par --Had to transfer care to another neurologist for treatment of his CIDP after Dr. Ricardo left practice.  Is still receiving IVIG every 3 weeks.\par --Had Moh's surgery for a lesion on the inner part of his R earlobe, and will need surgery for another lesion on the right side of his nose, and possibly for a lesion on his R calf which was recently biopsied.\par \par Says that his appetite is better, and he is maintaining his weight.  \par --Breakfast is an English muffin with jam and/or cream cheese\par --Lunch is limited to crackers and cheese\par --Will sometimes have fruit salad either with lunch or as an afternoon snack\par --Admits to drinking regular soda (ginger ale) during the day\par --Supper is usually chicken and pasta\par Pre-breakfast fingersticks checked by the infusion nurse have been in the  range.

## 2021-05-06 NOTE — REVIEW OF SYSTEMS
[Fatigue] : fatigue [Decreased Appetite] : decreased appetite [SOB on Exertion] : shortness of breath on exertion [Muscle Weakness] : muscle weakness [Dry Skin] : dry skin [Difficulty Walking] : difficulty walking [Poor Balance] : poor balance [Anxiety] : anxiety [Recent Weight Gain (___ Lbs)] : no recent weight gain [Recent Weight Loss (___ Lbs)] : no recent weight loss [Fever] : no fever [Chills] : no chills [Dry Eyes] : no dryness [Eyes Itch] : no itch [Hearing Loss] : no hearing loss  [Leg Claudication] : no leg claudication [Palpitations] : no palpitations [Lower Ext Edema] : no lower extremity edema [Shortness Of Breath] : no shortness of breath [Cough] : no cough [Wheezing] : no wheezing [Nausea] : no nausea [Heartburn] : no heartburn [Diarrhea] : no diarrhea [Polyuria] : no polyuria [Dysuria] : no dysuria [Joint Pain] : no joint pain [Myalgia] : no myalgia  [Joint Stiffness] : no joint stiffness [Ulcer] : no ulcer [Headaches] : no headaches [Pain/Numbness of Digits] : no pain/numbness of digits [Depression] : no depression [Stress] : no stress [Polydipsia] : no polydipsia [Cold Intolerance] : no cold intolerance [Heat Intolerance] : no heat intolerance [Easy Bleeding] : no ~M tendency for easy bleeding [Easy Bruising] : no tendency for easy bruising [FreeTextEntry3] : See comments in the HPI re: vision in his R eye [FreeTextEntry4] : Denies any dysphagia for solid foods or coughing after drinking liquids, but has trouble swallowing pills [FreeTextEntry5] : Chest tightness and SOB while walking home after exercise sessions at the gym [FreeTextEntry7] : Taking Miralax every other day for Sinemet-induced constipation [FreeTextEntry8] : Nocturia 3X/night [FreeTextEntry9] : Has significant difficulty getting up out of a chair [de-identified] : Recent Moh's surgery for a lesion in his R ear.  Will likely also need surgery for a lesion on the right side of his nose.  Had a recent biopsy of a lesion on his R calf [de-identified] : Tremor is reasonably well controlled.  Complains of worsening memory problems

## 2021-05-19 ENCOUNTER — APPOINTMENT (OUTPATIENT)
Dept: OPHTHALMOLOGY | Facility: CLINIC | Age: 86
End: 2021-05-19
Payer: MEDICARE

## 2021-05-19 ENCOUNTER — NON-APPOINTMENT (OUTPATIENT)
Age: 86
End: 2021-05-19

## 2021-05-19 PROCEDURE — 67028 INJECTION EYE DRUG: CPT | Mod: RT

## 2021-05-19 PROCEDURE — 92134 CPTRZ OPH DX IMG PST SGM RTA: CPT

## 2021-05-19 PROCEDURE — 92012 INTRM OPH EXAM EST PATIENT: CPT | Mod: 25

## 2021-05-19 PROCEDURE — 99072 ADDL SUPL MATRL&STAF TM PHE: CPT

## 2021-07-08 ENCOUNTER — EMERGENCY (EMERGENCY)
Facility: HOSPITAL | Age: 86
LOS: 1 days | Discharge: HOME | End: 2021-07-08
Attending: EMERGENCY MEDICINE | Admitting: EMERGENCY MEDICINE
Payer: MEDICARE

## 2021-07-08 VITALS
TEMPERATURE: 98 F | RESPIRATION RATE: 15 BRPM | HEART RATE: 74 BPM | WEIGHT: 198.42 LBS | DIASTOLIC BLOOD PRESSURE: 91 MMHG | OXYGEN SATURATION: 98 % | HEIGHT: 75 IN | SYSTOLIC BLOOD PRESSURE: 168 MMHG

## 2021-07-08 DIAGNOSIS — Z79.82 LONG TERM (CURRENT) USE OF ASPIRIN: ICD-10-CM

## 2021-07-08 DIAGNOSIS — M25.532 PAIN IN LEFT WRIST: ICD-10-CM

## 2021-07-08 DIAGNOSIS — H26.40 UNSPECIFIED SECONDARY CATARACT: Chronic | ICD-10-CM

## 2021-07-08 DIAGNOSIS — G20 PARKINSON'S DISEASE: ICD-10-CM
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
## 2021-07-08 PROCEDURE — 73110 X-RAY EXAM OF WRIST: CPT | Mod: 26,LT

## 2021-07-08 PROCEDURE — 73130 X-RAY EXAM OF HAND: CPT | Mod: 26,LT

## 2021-07-08 PROCEDURE — 99283 EMERGENCY DEPT VISIT LOW MDM: CPT | Mod: 25

## 2021-07-08 RX ORDER — LIDOCAINE 4 G/100G
1 CREAM TOPICAL ONCE
Refills: 0 | Status: COMPLETED | OUTPATIENT
Start: 2021-07-08 | End: 2021-07-08

## 2021-07-08 RX ORDER — ACETAMINOPHEN 500 MG
650 TABLET ORAL ONCE
Refills: 0 | Status: COMPLETED | OUTPATIENT
Start: 2021-07-08 | End: 2021-07-08

## 2021-07-08 RX ADMIN — Medication 650 MILLIGRAM(S): at 19:31

## 2021-07-08 RX ADMIN — LIDOCAINE 1 PATCH: 4 CREAM TOPICAL at 19:29

## 2021-07-08 NOTE — ED PROVIDER NOTE - PHYSICAL EXAMINATION
Physical Exam  GEN: Awake, alert, non-toxic appearing, NCAT  EYES: full EOMI,  ENT: External inspection normal, normal voice,   HEAD: atraumatic  NECK: FROM neck, supple,   RESP: no tachypnea, no hypoxia, no resp distress,  MSK: no deformity/tenderness to phalanx/metacarpal/wrist/forearm, pain w/ wrist ROM, no deformity/tenderness/pain to elbow/humerus/shoulder, soft compartment  SKIN: no overlying erythema/fluctuance/induration, radial pulse 2+ Physical Exam  GEN: Awake, alert, non-toxic appearing, NCAT  EYES: full EOMI,  ENT: External inspection normal, normal voice,   HEAD: atraumatic  NECK: FROM neck, supple,   RESP: no tachypnea, no hypoxia, no resp distress,  MSK: no deformity/tenderness to phalanx/metacarpal/wrist/forearm, pain w/ wrist ROM, no deformity/tenderness/pain to elbow/humerus/shoulder, soft compartment, nontender snuffbox, no pain w/ axial loading  SKIN: no overlying erythema/fluctuance/induration, radial pulse 2+

## 2021-07-08 NOTE — ED PROVIDER NOTE - NSFOLLOWUPINSTRUCTIONS_ED_ALL_ED_FT
Follow up with your primary care doctor/orthopedist if you are not improving  Please take off the lidoderm patch applied today  You can take 650mg of tylenol every 6 hours as needed   Keep your wrist in a splint for comfort as needed  Return immediately for any new or worsening symptoms or any new concerns Follow up with your primary care doctor/orthopedist if you are not improving  Please take off the lidoderm patch applied today after 12 hours  You can take 650mg of tylenol every 6 hours as needed   Keep your wrist in a splint for comfort as needed  Return immediately for any new or worsening symptoms or any new concerns

## 2021-07-08 NOTE — ED PROVIDER NOTE - PATIENT PORTAL LINK FT
You can access the FollowMyHealth Patient Portal offered by [facility] by registering at the following website: [email or web address]. By joining [facility]’s FollowMyHealth portal, you will also be able to view your health information using other applications (apps) compatible with our system.

## 2021-07-08 NOTE — ED PROVIDER NOTE - OBJECTIVE STATEMENT
86 yom pw L wrist pain.  pt reports lifted objects days ago but no fall/trauma.  pain mostly over 4th/5th metacarpal and ulnar aspect of the wrist.  no other injuries.  hx of parkinson's. 86 yom pw L wrist pain.  pt reports lifted objects days ago but no fall/trauma.  pain mostly over 2nd/3rd metacarpal and radial aspect of the wrist.  no other injuries.  hx of parkinson's.

## 2021-07-28 ENCOUNTER — APPOINTMENT (OUTPATIENT)
Dept: OPHTHALMOLOGY | Facility: CLINIC | Age: 86
End: 2021-07-28
Payer: MEDICARE

## 2021-07-28 ENCOUNTER — NON-APPOINTMENT (OUTPATIENT)
Age: 86
End: 2021-07-28

## 2021-07-28 PROCEDURE — 92134 CPTRZ OPH DX IMG PST SGM RTA: CPT

## 2021-07-28 PROCEDURE — 67028 INJECTION EYE DRUG: CPT | Mod: RT

## 2021-08-05 ENCOUNTER — OUTPATIENT (OUTPATIENT)
Dept: OUTPATIENT SERVICES | Facility: HOSPITAL | Age: 86
LOS: 1 days | End: 2021-08-05
Payer: MEDICARE

## 2021-08-05 ENCOUNTER — RESULT REVIEW (OUTPATIENT)
Age: 86
End: 2021-08-05

## 2021-08-05 DIAGNOSIS — H26.40 UNSPECIFIED SECONDARY CATARACT: Chronic | ICD-10-CM

## 2021-08-05 PROCEDURE — 73110 X-RAY EXAM OF WRIST: CPT | Mod: 26,LT

## 2021-09-23 ENCOUNTER — APPOINTMENT (OUTPATIENT)
Dept: ENDOCRINOLOGY | Facility: CLINIC | Age: 86
End: 2021-09-23
Payer: MEDICARE

## 2021-09-23 VITALS
TEMPERATURE: 97.6 F | OXYGEN SATURATION: 98 % | SYSTOLIC BLOOD PRESSURE: 154 MMHG | BODY MASS INDEX: 23.62 KG/M2 | DIASTOLIC BLOOD PRESSURE: 76 MMHG | HEIGHT: 75 IN | WEIGHT: 190 LBS | HEART RATE: 78 BPM

## 2021-09-23 VITALS
WEIGHT: 195 LBS | SYSTOLIC BLOOD PRESSURE: 172 MMHG | HEART RATE: 72 BPM | TEMPERATURE: 97.6 F | HEIGHT: 75 IN | OXYGEN SATURATION: 98 % | DIASTOLIC BLOOD PRESSURE: 92 MMHG | BODY MASS INDEX: 24.25 KG/M2

## 2021-09-23 DIAGNOSIS — R26.81 UNSTEADINESS ON FEET: ICD-10-CM

## 2021-09-23 PROCEDURE — 99214 OFFICE O/P EST MOD 30 MIN: CPT

## 2021-09-25 PROBLEM — R26.81 GAIT INSTABILITY: Status: ACTIVE | Noted: 2020-11-28

## 2021-09-25 NOTE — PHYSICAL EXAM
[Alert] : alert [Well Nourished] : well nourished [No Acute Distress] : no acute distress [Normal Sclera/Conjunctiva] : normal sclera/conjunctiva [EOMI] : extra ocular movement intact [PERRL] : pupils equal, round and reactive to light [No Proptosis] : no proptosis [No Lid Lag] : no lid lag [Normal Outer Ear/Nose] : the ears and nose were normal in appearance [Normal Hearing] : hearing was normal [No Neck Mass] : no neck mass was observed [No LAD] : no lymphadenopathy [Clear to Auscultation] : lungs were clear to auscultation bilaterally [No Murmurs] : no murmurs [Normal Rate] : heart rate was normal [Regular Rhythm] : with a regular rhythm [Carotids Normal] : carotid pulses were normal with no bruits [No Edema] : no peripheral edema [Not Tender] : non-tender [Soft] : abdomen soft [Normal Supraclavicular Nodes] : no supraclavicular lymphadenopathy [No HSM] : no hepato-splenomegaly [Normal Anterior Cervical Nodes] : no anterior cervical lymphadenopathy [No CVA Tenderness] : no ~M costovertebral angle tenderness [No Joint Swelling] : no joint swelling seen [Right Foot Was Examined] : right foot ~C was examined [Left Foot Was Examined] : left foot ~C was examined [Normal] : normal [2+] : 2+ in the dorsalis pedis [Vibration Dec.] : diminished vibratory sensation at the level of the toes [Position Sense Dec.] : diminished position sense at the level of the toes [#4 Diminished] : number 4 was diminished [#5 Diminished] : number 5 was diminished [#7 Diminished] : number 7 was diminished [#8 Diminished] : number 8 was diminished [Normal Affect] : the affect was normal [Normal Mood] : the mood was normal [Kyphosis] : no kyphosis present [Acanthosis Nigricans] : no acanthosis nigricans [Foot Ulcers] : no foot ulcers [Full ROM] : with limited range of motion [Swelling] : not swollen [Tenderness] : not tender [Erythema] : not erythematous [#1 Diminished] : number 1 was normal [#2 Diminished] : number 2 was normal [#3 Diminished] : number 3 was normal [#6 Diminished] : number 6 was normal [#9 Diminished] : number 9 was normal [#10 Diminished] : number 10 was normal [de-identified] : Moderately bradykinetic.  Speech is fairly clear [de-identified] : Moderate corneal arcus [de-identified] : Thyroid approximately 30 gm in size, firm and lobular in consistency [de-identified] : DP pulses 3+ bilaterally [de-identified] : Hip flexor and quadriceps strength 4+/5 bilaterally on static testing, but he is unable to rise from a chair without "rocking" forward a few times, and using his hands for assistance.  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  Vibratory sensation absent over the toes and malleoli

## 2021-09-25 NOTE — DATA REVIEWED
[FreeTextEntry1] : [facility]  (9/21/21)\par \par ,  A1c 5.9%\par CMP WNL except for mildly elevated globulins (likely due to IVIG therapy)\par LDL 74, HDL 49, TG 40\par Hb 12.5 gm, Fe sat 24%

## 2021-09-25 NOTE — HISTORY OF PRESENT ILLNESS
[FreeTextEntry1] : 86-year-old man who is followed for type 2 DM and hypertension.  His diabetes was diagnosed in 2016 when his A1c level (which was being followed because of pre-diabetes) christoph to 6.7%.  HIs glycemic control has been satisfactory on dietary modification alone.  He is also followed for a non-toxic multinodular goiter (with the dominant nodules having been negative on biopsy) and hypercholesterolemia (previously but not currently on statin therapy).  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) and Parkinsons disease (under fair control with Sinemet).\par \par Thinks that his Parkinsons is worse:\par --Having more problems with gait instability, and falls at least once a day--tends to constantly "trip over himself"  (Was seen at [facility] in July after a fall because of pain in his left wrist, but X-rays were negative for fracture)\par --May be having problems with "freezing,"  and needs to "rock" his body a few times in order to get OOB or get up from a chair.\par --Has not had any problems with worsening of his tremor, however, nor any problems with swallowing.\par Will be seeing his Parkinsons MD (Dr. Morrow) next week.\par Saw Dr. Good for evaluation of his exertional chest tightness and dyspnea, but stress test and echo were negative.\par \par Still receiving IVIG infusions every 3 weeks. New neurologist who will be following the CIDP will be James Jamison ([phone])\par Fingersticks done by the nurse who supervises his IVIG have been in the  range (fasting).  BPs checked by her have apparently been OK.\par Will be seeing Dr. Aragon for follow-up of the macular edema in his R eye next week.  His last visit was 2 months ago and he required an intravitreal injection.  He thinks that his vision in that eye is worse.  Has "floaters" in that eye but denies metamorphopsia.\par Current diet:\par --Breakfast is either an English muffin, a blueberry muffin or a bowl of fruit salad\par --Usually skips lunch\par --Main starch at supper is pasta--\par --Will have ice cream most nights after supper--"a good sized bowl"--and sometimes a few cookies

## 2021-09-25 NOTE — REVIEW OF SYSTEMS
[Fatigue] : fatigue [SOB on Exertion] : shortness of breath on exertion [Muscle Weakness] : muscle weakness [Dry Skin] : dry skin [Difficulty Walking] : difficulty walking [Tremors] : tremors [Poor Balance] : poor balance [Anxiety] : anxiety [Decreased Appetite] : appetite not decreased [Recent Weight Gain (___ Lbs)] : no recent weight gain [Recent Weight Loss (___ Lbs)] : no recent weight loss [Fever] : no fever [Chills] : no chills [Dry Eyes] : no dryness [Eyes Itch] : no itch [Hearing Loss] : no hearing loss  [Chest Pain] : no chest pain [Leg Claudication] : no leg claudication [Palpitations] : no palpitations [Lower Ext Edema] : no lower extremity edema [Shortness Of Breath] : no shortness of breath [Cough] : no cough [Wheezing] : no wheezing [Nausea] : no nausea [Heartburn] : no heartburn [Diarrhea] : no diarrhea [Polyuria] : no polyuria [Dysuria] : no dysuria [Joint Pain] : no joint pain [Myalgia] : no myalgia  [Joint Stiffness] : no joint stiffness [Ulcer] : no ulcer [Headaches] : no headaches [Pain/Numbness of Digits] : no pain/numbness of digits [Depression] : no depression [Stress] : no stress [Polydipsia] : no polydipsia [Cold Intolerance] : no cold intolerance [Heat Intolerance] : no heat intolerance [Easy Bleeding] : no ~M tendency for easy bleeding [Easy Bruising] : no tendency for easy bruising [FreeTextEntry3] : See comments in the HPI re: vision in his R eye [FreeTextEntry4] : Denies any dysphagia for solid foods or coughing after drinking liquids, but has trouble swallowing pills [FreeTextEntry7] : Constipation is under control with Miralax every other day [FreeTextEntry8] : Nocturia has increased to 4-5X/night [FreeTextEntry9] : Has significant difficulty getting up out of a chair [de-identified] : Still needs to have Mohs surgery for the lesion on the right side of his nose

## 2021-09-25 NOTE — ASSESSMENT
[FreeTextEntry1] : 1) Type 2 DM:  Glycemic control is excellent as assessed by A1c level despite his diet lapses with ice cream and ?? excessive pasta at supper.  The fingersticks done by his IVIG nurse are also in target range, but these are only fasting values. \par --Diet reinforced\par --Will not start metformin given his excellent A1c level and his difficulty swallowing pills\par \par 2) Hypercholesterolemia:  LDL-cholesterol level is only a trace above his target of 70 mg%.  Will hold off restarting statin therapy--also partially because of his difficulty with pills\par \par 3) Hypertension:  BP is distinctly elevated at today's visit despite the apparently normal values obtained by his nurse when he receives his IVIG infusions.\par --Strongly encouraged him to resume home monitoring\par --Will need to consider restarting lisinopril, which had been discontinued due to orthostasis\par \par 4)  Gait instability:  Is likely due to a combination of the Parkinsons and CIDP, and is becoming a serious problem.  He will discuss this with Dr. Morrow next week.\par --Suggested that he begin using a walker, and ask Dr. Morrow about a repeat evaluation for PT\par \par See for follow-up in 4 months.  CMP, lipids, A1c, TFTs before the visit\par Suggested that he speak to Dr. Wayne about a  referral for his worsening nocturia [FreeTextEntry2] : Diet, fall precautions

## 2021-09-30 NOTE — DATA REVIEWED
[FreeTextEntry1] : [facility]  (11/12/20)\par \par ,  A1c 5.9%\par CMP WNL except HCO3 34 meq/l\par LDL 75, HDL 50, TG 35\par CBC--Hb 12.2 gm\par 25-D level just into target range at 31 ng/ml\par 
none

## 2021-10-07 ENCOUNTER — NON-APPOINTMENT (OUTPATIENT)
Age: 86
End: 2021-10-07

## 2021-10-07 ENCOUNTER — APPOINTMENT (OUTPATIENT)
Dept: OPHTHALMOLOGY | Facility: CLINIC | Age: 86
End: 2021-10-07
Payer: MEDICARE

## 2021-10-07 PROCEDURE — 92134 CPTRZ OPH DX IMG PST SGM RTA: CPT

## 2021-10-07 PROCEDURE — 67028 INJECTION EYE DRUG: CPT | Mod: RT

## 2021-10-07 PROCEDURE — 92012 INTRM OPH EXAM EST PATIENT: CPT | Mod: 25

## 2021-11-05 NOTE — ED ADULT NURSE NOTE - CAS EDP DISCH TYPE
[facility] Case Management Initial Discharge Assessment    Met with Patient to discuss discharge plan. PCP: Leland [facility] MD                                Date of Last Visit: within the last 3 months        Discharge Planning    Living Arrangements: independently at home and lives in an apartment    Who do you live with? ALONE    Who helps you with your care:  self, family or PT HAS GOOD SUPPORT SYSTEM 2 FRIENDS WITH SIGNIFICANT OTHERS WHO HELP HER AS NEEDED AND CHECK ON HER DAILY IN THE SAME APARTMENT COMPLEX    If lives at home:     Do you have any barriers navigating in your home? no    Patient can perform ADL? Yes PRIOR TO HOSPITALIZATION BATHES TOILETS, DRIVES     Current Services (outpatient and in home) :  None    Dialysis: No    Is transportation available to get to your appointments? Yes  HAS FRIENDS AND FAMILY NEARBY WHO CAN TRANSPORT TO APPTS    DME Equipment:  yes - CANE, RAISED TOILET SEAT    Respiratory equipment: None    Respiratory provider:  no     Pharmacy:  yes - [address] [place] [address] with Medication Assistance Program?  No  DECLINES ANY NEEDS OR CONCERNS    Patient agreeable to [place] [id number]? Declined    Patient agreeable to SNF/Rehab? N/A    Other discharge needs identified? N/A    Does Patient Have a High-Risk for Readmission Diagnosis (CHF, PN, MI, COPD)? No         Initial Discharge Plan? (Note: please see concurrent daily documentation for any updates after initial note).     RETURN HOME DECLINES NEED FOR ANY SERVICES PT DID REQUEST NICODERM PATCH ON DISCHARGE, Rachel Edgar RN INFORMED    Readmission Risk              Risk of Unplanned Readmission:  6         Electronically signed by Yadira Spence RN on 11/5/2021 at 1:25 PM Home

## 2022-01-01 ENCOUNTER — APPOINTMENT (OUTPATIENT)
Dept: OPHTHALMOLOGY | Facility: CLINIC | Age: 87
End: 2022-01-01

## 2022-01-01 ENCOUNTER — INPATIENT (INPATIENT)
Facility: HOSPITAL | Age: 87
LOS: 5 days | Discharge: NURSING, REHAB OR LONG-TERM CARE | DRG: 91 | End: 2022-11-03
Attending: STUDENT IN AN ORGANIZED HEALTH CARE EDUCATION/TRAINING PROGRAM | Admitting: STUDENT IN AN ORGANIZED HEALTH CARE EDUCATION/TRAINING PROGRAM
Payer: MEDICARE

## 2022-01-01 ENCOUNTER — TRANSCRIPTION ENCOUNTER (OUTPATIENT)
Age: 87
End: 2022-01-01

## 2022-01-01 ENCOUNTER — APPOINTMENT (OUTPATIENT)
Dept: ENDOCRINOLOGY | Facility: CLINIC | Age: 87
End: 2022-01-01

## 2022-01-01 ENCOUNTER — EMERGENCY (EMERGENCY)
Facility: HOSPITAL | Age: 87
LOS: 1 days | Discharge: HOME | End: 2022-01-01
Admitting: EMERGENCY MEDICINE

## 2022-01-01 VITALS
DIASTOLIC BLOOD PRESSURE: 76 MMHG | TEMPERATURE: 99 F | RESPIRATION RATE: 18 BRPM | OXYGEN SATURATION: 97 % | HEART RATE: 78 BPM | SYSTOLIC BLOOD PRESSURE: 136 MMHG

## 2022-01-01 VITALS
OXYGEN SATURATION: 97 % | SYSTOLIC BLOOD PRESSURE: 150 MMHG | WEIGHT: 195 LBS | BODY MASS INDEX: 24.25 KG/M2 | HEART RATE: 79 BPM | TEMPERATURE: 96.6 F | DIASTOLIC BLOOD PRESSURE: 74 MMHG | HEIGHT: 75 IN | RESPIRATION RATE: 16 BRPM

## 2022-01-01 VITALS
TEMPERATURE: 98 F | HEART RATE: 69 BPM | OXYGEN SATURATION: 99 % | WEIGHT: 175.05 LBS | HEIGHT: 77 IN | SYSTOLIC BLOOD PRESSURE: 103 MMHG | RESPIRATION RATE: 18 BRPM | DIASTOLIC BLOOD PRESSURE: 66 MMHG

## 2022-01-01 VITALS
RESPIRATION RATE: 18 BRPM | SYSTOLIC BLOOD PRESSURE: 159 MMHG | HEIGHT: 77 IN | DIASTOLIC BLOOD PRESSURE: 84 MMHG | WEIGHT: 190.04 LBS | TEMPERATURE: 98 F | OXYGEN SATURATION: 96 % | HEART RATE: 95 BPM

## 2022-01-01 VITALS
RESPIRATION RATE: 20 BRPM | SYSTOLIC BLOOD PRESSURE: 117 MMHG | HEART RATE: 78 BPM | DIASTOLIC BLOOD PRESSURE: 71 MMHG | TEMPERATURE: 98 F | OXYGEN SATURATION: 97 %

## 2022-01-01 DIAGNOSIS — E44.0 MODERATE PROTEIN-CALORIE MALNUTRITION: ICD-10-CM

## 2022-01-01 DIAGNOSIS — Z91.81 HISTORY OF FALLING: ICD-10-CM
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
## 2022-01-01 DIAGNOSIS — G57.90 UNSPECIFIED MONONEUROPATHY OF UNSPECIFIED LOWER LIMB: ICD-10-CM
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
## 2022-01-01 DIAGNOSIS — G92.9 UNSPECIFIED TOXIC ENCEPHALOPATHY: ICD-10-CM

## 2022-01-01 DIAGNOSIS — G20 PARKINSON'S DISEASE: ICD-10-CM
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
## 2022-01-01 DIAGNOSIS — I10 ESSENTIAL (PRIMARY) HYPERTENSION: ICD-10-CM

## 2022-01-01 DIAGNOSIS — R41.0 DISORIENTATION, UNSPECIFIED: ICD-10-CM

## 2022-01-01 DIAGNOSIS — R09.02 HYPOXEMIA: ICD-10-CM

## 2022-01-01 DIAGNOSIS — M79.651 PAIN IN RIGHT THIGH: ICD-10-CM

## 2022-01-01 DIAGNOSIS — R63.8 OTHER SYMPTOMS AND SIGNS CONCERNING FOOD AND FLUID INTAKE: ICD-10-CM

## 2022-01-01 DIAGNOSIS — E83.42 HYPOMAGNESEMIA: ICD-10-CM

## 2022-01-01 DIAGNOSIS — I21.A1 MYOCARDIAL INFARCTION TYPE 2: ICD-10-CM

## 2022-01-01 DIAGNOSIS — K59.03 DRUG INDUCED CONSTIPATION: ICD-10-CM

## 2022-01-01 DIAGNOSIS — T36.8X5A ADVERSE EFFECT OF OTHER SYSTEMIC ANTIBIOTICS, INITIAL ENCOUNTER: ICD-10-CM
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
## 2022-01-01 DIAGNOSIS — Z20.822 CONTACT WITH AND (SUSPECTED) EXPOSURE TO COVID-19: ICD-10-CM

## 2022-01-01 DIAGNOSIS — M79.604 PAIN IN RIGHT LEG: ICD-10-CM

## 2022-01-01 DIAGNOSIS — Z79.82 LONG TERM (CURRENT) USE OF ASPIRIN: ICD-10-CM

## 2022-01-01 DIAGNOSIS — R62.7 ADULT FAILURE TO THRIVE: ICD-10-CM

## 2022-01-01 DIAGNOSIS — E11.9 TYPE 2 DIABETES MELLITUS WITHOUT COMPLICATIONS: ICD-10-CM

## 2022-01-01 DIAGNOSIS — G61.81 CHRONIC INFLAMMATORY DEMYELINATING POLYNEURITIS: ICD-10-CM

## 2022-01-01 DIAGNOSIS — Z87.898 PERSONAL HISTORY OF OTHER SPECIFIED CONDITIONS: ICD-10-CM

## 2022-01-01 DIAGNOSIS — F05 DELIRIUM DUE TO KNOWN PHYSIOLOGICAL CONDITION: ICD-10-CM

## 2022-01-01 DIAGNOSIS — E11.40 TYPE 2 DIABETES MELLITUS WITH DIABETIC NEUROPATHY, UNSPECIFIED: ICD-10-CM

## 2022-01-01 DIAGNOSIS — G92 TOXIC ENCEPHALOPATHY: ICD-10-CM

## 2022-01-01 DIAGNOSIS — J98.11 ATELECTASIS: ICD-10-CM

## 2022-01-01 DIAGNOSIS — H26.40 UNSPECIFIED SECONDARY CATARACT: Chronic | ICD-10-CM

## 2022-01-01 LAB
A1C WITH ESTIMATED AVERAGE GLUCOSE RESULT: 6.5 % — HIGH (ref 4–5.6)
ALBUMIN SERPL ELPH-MCNC: 3.4 G/DL — SIGNIFICANT CHANGE UP (ref 3.3–5)
ALBUMIN SERPL ELPH-MCNC: 3.4 G/DL — SIGNIFICANT CHANGE UP (ref 3.4–5)
ALBUMIN SERPL ELPH-MCNC: 3.5 G/DL — SIGNIFICANT CHANGE UP (ref 3.3–5)
ALBUMIN SERPL ELPH-MCNC: 3.6 G/DL — SIGNIFICANT CHANGE UP (ref 3.4–5)
ALBUMIN SERPL ELPH-MCNC: 3.9 G/DL — SIGNIFICANT CHANGE UP (ref 3.3–5)
ALP SERPL-CCNC: 58 U/L — SIGNIFICANT CHANGE UP (ref 40–120)
ALP SERPL-CCNC: 61 U/L — SIGNIFICANT CHANGE UP (ref 40–120)
ALP SERPL-CCNC: 62 U/L — SIGNIFICANT CHANGE UP (ref 40–120)
ALP SERPL-CCNC: 67 U/L — SIGNIFICANT CHANGE UP (ref 40–120)
ALP SERPL-CCNC: 94 U/L — SIGNIFICANT CHANGE UP (ref 40–120)
ALT FLD-CCNC: 7 U/L — LOW (ref 12–42)
ALT FLD-CCNC: 9 U/L — LOW (ref 12–42)
ALT FLD-CCNC: <5 U/L — LOW (ref 10–45)
ANION GAP SERPL CALC-SCNC: 10 MMOL/L — SIGNIFICANT CHANGE UP (ref 5–17)
ANION GAP SERPL CALC-SCNC: 12 MMOL/L — SIGNIFICANT CHANGE UP (ref 5–17)
ANION GAP SERPL CALC-SCNC: 6 MMOL/L — LOW (ref 9–16)
ANION GAP SERPL CALC-SCNC: 6 MMOL/L — LOW (ref 9–16)
ANION GAP SERPL CALC-SCNC: 7 MMOL/L — SIGNIFICANT CHANGE UP (ref 5–17)
ANION GAP SERPL CALC-SCNC: 8 MMOL/L — SIGNIFICANT CHANGE UP (ref 5–17)
ANION GAP SERPL CALC-SCNC: 9 MMOL/L — SIGNIFICANT CHANGE UP (ref 5–17)
APPEARANCE UR: CLEAR — SIGNIFICANT CHANGE UP
AST SERPL-CCNC: 15 U/L — SIGNIFICANT CHANGE UP (ref 15–37)
AST SERPL-CCNC: 20 U/L — SIGNIFICANT CHANGE UP (ref 10–40)
AST SERPL-CCNC: 31 U/L — SIGNIFICANT CHANGE UP (ref 15–37)
AST SERPL-CCNC: 32 U/L — SIGNIFICANT CHANGE UP (ref 10–40)
AST SERPL-CCNC: 45 U/L — HIGH (ref 10–40)
BASOPHILS # BLD AUTO: 0.01 K/UL — SIGNIFICANT CHANGE UP (ref 0–0.2)
BASOPHILS # BLD AUTO: 0.01 K/UL — SIGNIFICANT CHANGE UP (ref 0–0.2)
BASOPHILS # BLD AUTO: 0.02 K/UL — SIGNIFICANT CHANGE UP (ref 0–0.2)
BASOPHILS # BLD AUTO: 0.03 K/UL — SIGNIFICANT CHANGE UP (ref 0–0.2)
BASOPHILS # BLD AUTO: 0.04 K/UL — SIGNIFICANT CHANGE UP (ref 0–0.2)
BASOPHILS NFR BLD AUTO: 0.2 % — SIGNIFICANT CHANGE UP (ref 0–2)
BASOPHILS NFR BLD AUTO: 0.2 % — SIGNIFICANT CHANGE UP (ref 0–2)
BASOPHILS NFR BLD AUTO: 0.3 % — SIGNIFICANT CHANGE UP (ref 0–2)
BASOPHILS NFR BLD AUTO: 0.5 % — SIGNIFICANT CHANGE UP (ref 0–2)
BASOPHILS NFR BLD AUTO: 0.5 % — SIGNIFICANT CHANGE UP (ref 0–2)
BILIRUB SERPL-MCNC: 0.4 MG/DL — SIGNIFICANT CHANGE UP (ref 0.2–1.2)
BILIRUB SERPL-MCNC: 0.4 MG/DL — SIGNIFICANT CHANGE UP (ref 0.2–1.2)
BILIRUB SERPL-MCNC: 0.6 MG/DL — SIGNIFICANT CHANGE UP (ref 0.2–1.2)
BILIRUB UR-MCNC: ABNORMAL
BUN SERPL-MCNC: 14 MG/DL — SIGNIFICANT CHANGE UP (ref 7–23)
BUN SERPL-MCNC: 16 MG/DL — SIGNIFICANT CHANGE UP (ref 7–23)
BUN SERPL-MCNC: 20 MG/DL — SIGNIFICANT CHANGE UP (ref 7–23)
BUN SERPL-MCNC: 20 MG/DL — SIGNIFICANT CHANGE UP (ref 7–23)
BUN SERPL-MCNC: 22 MG/DL — SIGNIFICANT CHANGE UP (ref 7–23)
BUN SERPL-MCNC: 24 MG/DL — HIGH (ref 7–23)
BUN SERPL-MCNC: 27 MG/DL — HIGH (ref 7–23)
CALCIUM SERPL-MCNC: 8.6 MG/DL — SIGNIFICANT CHANGE UP (ref 8.4–10.5)
CALCIUM SERPL-MCNC: 8.6 MG/DL — SIGNIFICANT CHANGE UP (ref 8.4–10.5)
CALCIUM SERPL-MCNC: 8.6 MG/DL — SIGNIFICANT CHANGE UP (ref 8.5–10.5)
CALCIUM SERPL-MCNC: 8.8 MG/DL — SIGNIFICANT CHANGE UP (ref 8.4–10.5)
CALCIUM SERPL-MCNC: 8.9 MG/DL — SIGNIFICANT CHANGE UP (ref 8.4–10.5)
CALCIUM SERPL-MCNC: 9 MG/DL — SIGNIFICANT CHANGE UP (ref 8.4–10.5)
CALCIUM SERPL-MCNC: 9 MG/DL — SIGNIFICANT CHANGE UP (ref 8.5–10.5)
CHLORIDE SERPL-SCNC: 104 MMOL/L — SIGNIFICANT CHANGE UP (ref 96–108)
CHLORIDE SERPL-SCNC: 105 MMOL/L — SIGNIFICANT CHANGE UP (ref 96–108)
CHLORIDE SERPL-SCNC: 106 MMOL/L — SIGNIFICANT CHANGE UP (ref 96–108)
CHLORIDE SERPL-SCNC: 106 MMOL/L — SIGNIFICANT CHANGE UP (ref 96–108)
CK MB BLD-MCNC: 0.94 % — SIGNIFICANT CHANGE UP
CK MB CFR SERPL CALC: 10.5 NG/ML — HIGH (ref 0–6.7)
CK MB CFR SERPL CALC: 3.8 NG/ML — HIGH (ref 0.5–3.6)
CK MB CFR SERPL CALC: 5.4 NG/ML — SIGNIFICANT CHANGE UP (ref 0–6.7)
CK SERPL-CCNC: 378 U/L — HIGH (ref 30–200)
CK SERPL-CCNC: 394 U/L — HIGH (ref 30–200)
CK SERPL-CCNC: 403 U/L — HIGH (ref 39–308)
CK SERPL-CCNC: 71 U/L — SIGNIFICANT CHANGE UP (ref 39–308)
CK SERPL-CCNC: 909 U/L — HIGH (ref 30–200)
CO2 SERPL-SCNC: 26 MMOL/L — SIGNIFICANT CHANGE UP (ref 22–31)
CO2 SERPL-SCNC: 29 MMOL/L — SIGNIFICANT CHANGE UP (ref 22–31)
CO2 SERPL-SCNC: 29 MMOL/L — SIGNIFICANT CHANGE UP (ref 22–31)
CO2 SERPL-SCNC: 30 MMOL/L — SIGNIFICANT CHANGE UP (ref 22–31)
CO2 SERPL-SCNC: 31 MMOL/L — SIGNIFICANT CHANGE UP (ref 22–31)
CO2 SERPL-SCNC: 31 MMOL/L — SIGNIFICANT CHANGE UP (ref 22–31)
CO2 SERPL-SCNC: 33 MMOL/L — HIGH (ref 22–31)
COLOR SPEC: YELLOW — SIGNIFICANT CHANGE UP
CREAT SERPL-MCNC: 0.86 MG/DL — SIGNIFICANT CHANGE UP (ref 0.5–1.3)
CREAT SERPL-MCNC: 0.91 MG/DL — SIGNIFICANT CHANGE UP (ref 0.5–1.3)
CREAT SERPL-MCNC: 0.96 MG/DL — SIGNIFICANT CHANGE UP (ref 0.5–1.3)
CREAT SERPL-MCNC: 0.97 MG/DL — SIGNIFICANT CHANGE UP (ref 0.5–1.3)
CREAT SERPL-MCNC: 1 MG/DL — SIGNIFICANT CHANGE UP (ref 0.5–1.3)
CREAT SERPL-MCNC: 1.22 MG/DL — SIGNIFICANT CHANGE UP (ref 0.5–1.3)
CREAT SERPL-MCNC: 1.24 MG/DL — SIGNIFICANT CHANGE UP (ref 0.5–1.3)
DIFF PNL FLD: ABNORMAL
EGFR: 57 ML/MIN/1.73M2 — LOW
EGFR: 58 ML/MIN/1.73M2 — LOW
EGFR: 74 ML/MIN/1.73M2 — SIGNIFICANT CHANGE UP
EGFR: 76 ML/MIN/1.73M2 — SIGNIFICANT CHANGE UP
EGFR: 77 ML/MIN/1.73M2 — SIGNIFICANT CHANGE UP
EGFR: 83 ML/MIN/1.73M2 — SIGNIFICANT CHANGE UP
EGFR: 85 ML/MIN/1.73M2 — SIGNIFICANT CHANGE UP
EOSINOPHIL # BLD AUTO: 0.01 K/UL — SIGNIFICANT CHANGE UP (ref 0–0.5)
EOSINOPHIL # BLD AUTO: 0.01 K/UL — SIGNIFICANT CHANGE UP (ref 0–0.5)
EOSINOPHIL # BLD AUTO: 0.02 K/UL — SIGNIFICANT CHANGE UP (ref 0–0.5)
EOSINOPHIL # BLD AUTO: 0.05 K/UL — SIGNIFICANT CHANGE UP (ref 0–0.5)
EOSINOPHIL # BLD AUTO: 0.14 K/UL — SIGNIFICANT CHANGE UP (ref 0–0.5)
EOSINOPHIL NFR BLD AUTO: 0.2 % — SIGNIFICANT CHANGE UP (ref 0–6)
EOSINOPHIL NFR BLD AUTO: 0.2 % — SIGNIFICANT CHANGE UP (ref 0–6)
EOSINOPHIL NFR BLD AUTO: 0.3 % — SIGNIFICANT CHANGE UP (ref 0–6)
EOSINOPHIL NFR BLD AUTO: 1.2 % — SIGNIFICANT CHANGE UP (ref 0–6)
EOSINOPHIL NFR BLD AUTO: 2.2 % — SIGNIFICANT CHANGE UP (ref 0–6)
ESTIMATED AVERAGE GLUCOSE: 140 MG/DL — HIGH (ref 68–114)
GLUCOSE BLDC GLUCOMTR-MCNC: 104 MG/DL — HIGH (ref 70–99)
GLUCOSE BLDC GLUCOMTR-MCNC: 107 MG/DL — HIGH (ref 70–99)
GLUCOSE BLDC GLUCOMTR-MCNC: 110 MG/DL — HIGH (ref 70–99)
GLUCOSE BLDC GLUCOMTR-MCNC: 111 MG/DL — HIGH (ref 70–99)
GLUCOSE BLDC GLUCOMTR-MCNC: 112 MG/DL — HIGH (ref 70–99)
GLUCOSE BLDC GLUCOMTR-MCNC: 144 MG/DL — HIGH (ref 70–99)
GLUCOSE BLDC GLUCOMTR-MCNC: 155 MG/DL — HIGH (ref 70–99)
GLUCOSE SERPL-MCNC: 101 MG/DL — HIGH (ref 70–99)
GLUCOSE SERPL-MCNC: 106 MG/DL — HIGH (ref 70–99)
GLUCOSE SERPL-MCNC: 110 MG/DL — HIGH (ref 70–99)
GLUCOSE SERPL-MCNC: 115 MG/DL — HIGH (ref 70–99)
GLUCOSE SERPL-MCNC: 131 MG/DL — HIGH (ref 70–99)
GLUCOSE SERPL-MCNC: 147 MG/DL — HIGH (ref 70–99)
GLUCOSE SERPL-MCNC: 187 MG/DL — HIGH (ref 70–99)
GLUCOSE UR QL: NEGATIVE — SIGNIFICANT CHANGE UP
HCT VFR BLD CALC: 36.1 % — LOW (ref 39–50)
HCT VFR BLD CALC: 36.4 % — LOW (ref 39–50)
HCT VFR BLD CALC: 36.4 % — LOW (ref 39–50)
HCT VFR BLD CALC: 36.5 % — LOW (ref 39–50)
HCT VFR BLD CALC: 37 % — LOW (ref 39–50)
HCT VFR BLD CALC: 37.6 % — LOW (ref 39–50)
HCT VFR BLD CALC: 37.7 % — LOW (ref 39–50)
HGB BLD-MCNC: 11.4 G/DL — LOW (ref 13–17)
HGB BLD-MCNC: 11.5 G/DL — LOW (ref 13–17)
HGB BLD-MCNC: 11.7 G/DL — LOW (ref 13–17)
HGB BLD-MCNC: 11.8 G/DL — LOW (ref 13–17)
HGB BLD-MCNC: 11.9 G/DL — LOW (ref 13–17)
HGB BLD-MCNC: 12 G/DL — LOW (ref 13–17)
HGB BLD-MCNC: 12.1 G/DL — LOW (ref 13–17)
IMM GRANULOCYTES NFR BLD AUTO: 0.2 % — SIGNIFICANT CHANGE UP (ref 0–0.9)
IMM GRANULOCYTES NFR BLD AUTO: 0.4 % — SIGNIFICANT CHANGE UP (ref 0–0.9)
IMM GRANULOCYTES NFR BLD AUTO: 0.5 % — SIGNIFICANT CHANGE UP (ref 0–0.9)
KETONES UR-MCNC: 15 MG/DL
LACTATE SERPL-SCNC: 1.3 MMOL/L — SIGNIFICANT CHANGE UP (ref 0.4–2)
LEUKOCYTE ESTERASE UR-ACNC: NEGATIVE — SIGNIFICANT CHANGE UP
LYMPHOCYTES # BLD AUTO: 0.53 K/UL — LOW (ref 1–3.3)
LYMPHOCYTES # BLD AUTO: 0.7 K/UL — LOW (ref 1–3.3)
LYMPHOCYTES # BLD AUTO: 0.72 K/UL — LOW (ref 1–3.3)
LYMPHOCYTES # BLD AUTO: 0.87 K/UL — LOW (ref 1–3.3)
LYMPHOCYTES # BLD AUTO: 1.03 K/UL — SIGNIFICANT CHANGE UP (ref 1–3.3)
LYMPHOCYTES # BLD AUTO: 11 % — LOW (ref 13–44)
LYMPHOCYTES # BLD AUTO: 16.5 % — SIGNIFICANT CHANGE UP (ref 13–44)
LYMPHOCYTES # BLD AUTO: 21 % — SIGNIFICANT CHANGE UP (ref 13–44)
LYMPHOCYTES # BLD AUTO: 8 % — LOW (ref 13–44)
LYMPHOCYTES # BLD AUTO: 9.5 % — LOW (ref 13–44)
MAGNESIUM SERPL-MCNC: 1.4 MG/DL — LOW (ref 1.6–2.6)
MAGNESIUM SERPL-MCNC: 1.8 MG/DL — SIGNIFICANT CHANGE UP (ref 1.6–2.6)
MAGNESIUM SERPL-MCNC: 1.9 MG/DL — SIGNIFICANT CHANGE UP (ref 1.6–2.6)
MAGNESIUM SERPL-MCNC: 1.9 MG/DL — SIGNIFICANT CHANGE UP (ref 1.6–2.6)
MCHC RBC-ENTMCNC: 28.9 PG — SIGNIFICANT CHANGE UP (ref 27–34)
MCHC RBC-ENTMCNC: 29.2 PG — SIGNIFICANT CHANGE UP (ref 27–34)
MCHC RBC-ENTMCNC: 29.4 PG — SIGNIFICANT CHANGE UP (ref 27–34)
MCHC RBC-ENTMCNC: 29.6 PG — SIGNIFICANT CHANGE UP (ref 27–34)
MCHC RBC-ENTMCNC: 30 PG — SIGNIFICANT CHANGE UP (ref 27–34)
MCHC RBC-ENTMCNC: 31.2 GM/DL — LOW (ref 32–36)
MCHC RBC-ENTMCNC: 31.3 GM/DL — LOW (ref 32–36)
MCHC RBC-ENTMCNC: 31.6 GM/DL — LOW (ref 32–36)
MCHC RBC-ENTMCNC: 32.1 GM/DL — SIGNIFICANT CHANGE UP (ref 32–36)
MCHC RBC-ENTMCNC: 32.2 GM/DL — SIGNIFICANT CHANGE UP (ref 32–36)
MCHC RBC-ENTMCNC: 32.4 GM/DL — SIGNIFICANT CHANGE UP (ref 32–36)
MCHC RBC-ENTMCNC: 33 GM/DL — SIGNIFICANT CHANGE UP (ref 32–36)
MCV RBC AUTO: 90.8 FL — SIGNIFICANT CHANGE UP (ref 80–100)
MCV RBC AUTO: 90.9 FL — SIGNIFICANT CHANGE UP (ref 80–100)
MCV RBC AUTO: 91.4 FL — SIGNIFICANT CHANGE UP (ref 80–100)
MCV RBC AUTO: 91.5 FL — SIGNIFICANT CHANGE UP (ref 80–100)
MCV RBC AUTO: 91.5 FL — SIGNIFICANT CHANGE UP (ref 80–100)
MCV RBC AUTO: 92.2 FL — SIGNIFICANT CHANGE UP (ref 80–100)
MCV RBC AUTO: 92.6 FL — SIGNIFICANT CHANGE UP (ref 80–100)
MONOCYTES # BLD AUTO: 0.48 K/UL — SIGNIFICANT CHANGE UP (ref 0–0.9)
MONOCYTES # BLD AUTO: 0.52 K/UL — SIGNIFICANT CHANGE UP (ref 0–0.9)
MONOCYTES # BLD AUTO: 0.55 K/UL — SIGNIFICANT CHANGE UP (ref 0–0.9)
MONOCYTES # BLD AUTO: 0.55 K/UL — SIGNIFICANT CHANGE UP (ref 0–0.9)
MONOCYTES # BLD AUTO: 0.67 K/UL — SIGNIFICANT CHANGE UP (ref 0–0.9)
MONOCYTES NFR BLD AUTO: 11.6 % — SIGNIFICANT CHANGE UP (ref 2–14)
MONOCYTES NFR BLD AUTO: 8.3 % — SIGNIFICANT CHANGE UP (ref 2–14)
MONOCYTES NFR BLD AUTO: 8.3 % — SIGNIFICANT CHANGE UP (ref 2–14)
MONOCYTES NFR BLD AUTO: 8.4 % — SIGNIFICANT CHANGE UP (ref 2–14)
MONOCYTES NFR BLD AUTO: 9.1 % — SIGNIFICANT CHANGE UP (ref 2–14)
NEUTROPHILS # BLD AUTO: 2.73 K/UL — SIGNIFICANT CHANGE UP (ref 1.8–7.4)
NEUTROPHILS # BLD AUTO: 4.51 K/UL — SIGNIFICANT CHANGE UP (ref 1.8–7.4)
NEUTROPHILS # BLD AUTO: 5.21 K/UL — SIGNIFICANT CHANGE UP (ref 1.8–7.4)
NEUTROPHILS # BLD AUTO: 5.45 K/UL — SIGNIFICANT CHANGE UP (ref 1.8–7.4)
NEUTROPHILS # BLD AUTO: 5.9 K/UL — SIGNIFICANT CHANGE UP (ref 1.8–7.4)
NEUTROPHILS NFR BLD AUTO: 65.8 % — SIGNIFICANT CHANGE UP (ref 43–77)
NEUTROPHILS NFR BLD AUTO: 72 % — SIGNIFICANT CHANGE UP (ref 43–77)
NEUTROPHILS NFR BLD AUTO: 79.7 % — HIGH (ref 43–77)
NEUTROPHILS NFR BLD AUTO: 80.2 % — HIGH (ref 43–77)
NEUTROPHILS NFR BLD AUTO: 82.7 % — HIGH (ref 43–77)
NITRITE UR-MCNC: NEGATIVE — SIGNIFICANT CHANGE UP
NRBC # BLD: 0 /100 WBCS — SIGNIFICANT CHANGE UP (ref 0–0)
PH UR: 6 — SIGNIFICANT CHANGE UP (ref 5–8)
PHOSPHATE SERPL-MCNC: 2.5 MG/DL — SIGNIFICANT CHANGE UP (ref 2.5–4.5)
PHOSPHATE SERPL-MCNC: 2.7 MG/DL — SIGNIFICANT CHANGE UP (ref 2.5–4.5)
PHOSPHATE SERPL-MCNC: 2.7 MG/DL — SIGNIFICANT CHANGE UP (ref 2.5–4.5)
PHOSPHATE SERPL-MCNC: 2.8 MG/DL — SIGNIFICANT CHANGE UP (ref 2.5–4.5)
PHOSPHATE SERPL-MCNC: 2.9 MG/DL — SIGNIFICANT CHANGE UP (ref 2.5–4.5)
PLATELET # BLD AUTO: 129 K/UL — LOW (ref 150–400)
PLATELET # BLD AUTO: 166 K/UL — SIGNIFICANT CHANGE UP (ref 150–400)
PLATELET # BLD AUTO: 166 K/UL — SIGNIFICANT CHANGE UP (ref 150–400)
PLATELET # BLD AUTO: 169 K/UL — SIGNIFICANT CHANGE UP (ref 150–400)
PLATELET # BLD AUTO: 176 K/UL — SIGNIFICANT CHANGE UP (ref 150–400)
PLATELET # BLD AUTO: 178 K/UL — SIGNIFICANT CHANGE UP (ref 150–400)
PLATELET # BLD AUTO: 178 K/UL — SIGNIFICANT CHANGE UP (ref 150–400)
POTASSIUM SERPL-MCNC: 3.4 MMOL/L — LOW (ref 3.5–5.3)
POTASSIUM SERPL-MCNC: 3.5 MMOL/L — SIGNIFICANT CHANGE UP (ref 3.5–5.3)
POTASSIUM SERPL-MCNC: 3.6 MMOL/L — SIGNIFICANT CHANGE UP (ref 3.5–5.3)
POTASSIUM SERPL-MCNC: 3.6 MMOL/L — SIGNIFICANT CHANGE UP (ref 3.5–5.3)
POTASSIUM SERPL-MCNC: 3.7 MMOL/L — SIGNIFICANT CHANGE UP (ref 3.5–5.3)
POTASSIUM SERPL-MCNC: 4 MMOL/L — SIGNIFICANT CHANGE UP (ref 3.5–5.3)
POTASSIUM SERPL-MCNC: 4.1 MMOL/L — SIGNIFICANT CHANGE UP (ref 3.5–5.3)
POTASSIUM SERPL-SCNC: 3.4 MMOL/L — LOW (ref 3.5–5.3)
POTASSIUM SERPL-SCNC: 3.5 MMOL/L — SIGNIFICANT CHANGE UP (ref 3.5–5.3)
POTASSIUM SERPL-SCNC: 3.6 MMOL/L — SIGNIFICANT CHANGE UP (ref 3.5–5.3)
POTASSIUM SERPL-SCNC: 3.6 MMOL/L — SIGNIFICANT CHANGE UP (ref 3.5–5.3)
POTASSIUM SERPL-SCNC: 3.7 MMOL/L — SIGNIFICANT CHANGE UP (ref 3.5–5.3)
POTASSIUM SERPL-SCNC: 4 MMOL/L — SIGNIFICANT CHANGE UP (ref 3.5–5.3)
POTASSIUM SERPL-SCNC: 4.1 MMOL/L — SIGNIFICANT CHANGE UP (ref 3.5–5.3)
PROCALCITONIN SERPL-MCNC: 0.11 NG/ML — HIGH (ref 0.02–0.1)
PROCALCITONIN SERPL-MCNC: 0.16 NG/ML — HIGH (ref 0.02–0.1)
PROT SERPL-MCNC: 6.9 G/DL — SIGNIFICANT CHANGE UP (ref 6–8.3)
PROT SERPL-MCNC: 6.9 G/DL — SIGNIFICANT CHANGE UP (ref 6–8.3)
PROT SERPL-MCNC: 7.1 G/DL — SIGNIFICANT CHANGE UP (ref 6–8.3)
PROT SERPL-MCNC: 7.9 G/DL — SIGNIFICANT CHANGE UP (ref 6.4–8.2)
PROT SERPL-MCNC: 7.9 G/DL — SIGNIFICANT CHANGE UP (ref 6.4–8.2)
PROT UR-MCNC: 30 MG/DL
RBC # BLD: 3.94 M/UL — LOW (ref 4.2–5.8)
RBC # BLD: 3.97 M/UL — LOW (ref 4.2–5.8)
RBC # BLD: 3.98 M/UL — LOW (ref 4.2–5.8)
RBC # BLD: 4.01 M/UL — LOW (ref 4.2–5.8)
RBC # BLD: 4.05 M/UL — LOW (ref 4.2–5.8)
RBC # BLD: 4.09 M/UL — LOW (ref 4.2–5.8)
RBC # BLD: 4.11 M/UL — LOW (ref 4.2–5.8)
RBC # FLD: 12.6 % — SIGNIFICANT CHANGE UP (ref 10.3–14.5)
RBC # FLD: 12.7 % — SIGNIFICANT CHANGE UP (ref 10.3–14.5)
RBC # FLD: 12.8 % — SIGNIFICANT CHANGE UP (ref 10.3–14.5)
RBC # FLD: 12.9 % — SIGNIFICANT CHANGE UP (ref 10.3–14.5)
RBC # FLD: 13.2 % — SIGNIFICANT CHANGE UP (ref 10.3–14.5)
RBC CASTS # UR COMP ASSIST: < 5 /HPF — SIGNIFICANT CHANGE UP
SARS-COV-2 RNA SPEC QL NAA+PROBE: SIGNIFICANT CHANGE UP
SODIUM SERPL-SCNC: 140 MMOL/L — SIGNIFICANT CHANGE UP (ref 132–145)
SODIUM SERPL-SCNC: 142 MMOL/L — SIGNIFICANT CHANGE UP (ref 135–145)
SODIUM SERPL-SCNC: 143 MMOL/L — SIGNIFICANT CHANGE UP (ref 135–145)
SODIUM SERPL-SCNC: 143 MMOL/L — SIGNIFICANT CHANGE UP (ref 135–145)
SODIUM SERPL-SCNC: 144 MMOL/L — SIGNIFICANT CHANGE UP (ref 132–145)
SODIUM SERPL-SCNC: 145 MMOL/L — SIGNIFICANT CHANGE UP (ref 135–145)
SODIUM SERPL-SCNC: 146 MMOL/L — HIGH (ref 135–145)
SP GR SPEC: >=1.03 — SIGNIFICANT CHANGE UP (ref 1–1.03)
TROPONIN I, HIGH SENSITIVITY RESULT: 79.3 NG/L — HIGH
TROPONIN T SERPL-MCNC: 0.03 NG/ML — HIGH (ref 0–0.01)
TROPONIN T SERPL-MCNC: 0.03 NG/ML — HIGH (ref 0–0.01)
TROPONIN T SERPL-MCNC: 0.04 NG/ML — CRITICAL HIGH (ref 0–0.01)
TROPONIN T SERPL-MCNC: 0.04 NG/ML — CRITICAL HIGH (ref 0–0.01)
TROPONIN T SERPL-MCNC: 0.05 NG/ML — CRITICAL HIGH (ref 0–0.01)
TSH SERPL-MCNC: 0.79 UIU/ML — SIGNIFICANT CHANGE UP (ref 0.27–4.2)
UROBILINOGEN FLD QL: 1 E.U./DL — SIGNIFICANT CHANGE UP
WBC # BLD: 4.15 K/UL — SIGNIFICANT CHANGE UP (ref 3.8–10.5)
WBC # BLD: 6.26 K/UL — SIGNIFICANT CHANGE UP (ref 3.8–10.5)
WBC # BLD: 6.34 K/UL — SIGNIFICANT CHANGE UP (ref 3.8–10.5)
WBC # BLD: 6.53 K/UL — SIGNIFICANT CHANGE UP (ref 3.8–10.5)
WBC # BLD: 6.59 K/UL — SIGNIFICANT CHANGE UP (ref 3.8–10.5)
WBC # BLD: 7.03 K/UL — SIGNIFICANT CHANGE UP (ref 3.8–10.5)
WBC # BLD: 7.36 K/UL — SIGNIFICANT CHANGE UP (ref 3.8–10.5)
WBC # FLD AUTO: 4.15 K/UL — SIGNIFICANT CHANGE UP (ref 3.8–10.5)
WBC # FLD AUTO: 6.26 K/UL — SIGNIFICANT CHANGE UP (ref 3.8–10.5)
WBC # FLD AUTO: 6.34 K/UL — SIGNIFICANT CHANGE UP (ref 3.8–10.5)
WBC # FLD AUTO: 6.53 K/UL — SIGNIFICANT CHANGE UP (ref 3.8–10.5)
WBC # FLD AUTO: 6.59 K/UL — SIGNIFICANT CHANGE UP (ref 3.8–10.5)
WBC # FLD AUTO: 7.03 K/UL — SIGNIFICANT CHANGE UP (ref 3.8–10.5)
WBC # FLD AUTO: 7.36 K/UL — SIGNIFICANT CHANGE UP (ref 3.8–10.5)
WBC UR QL: < 5 /HPF — SIGNIFICANT CHANGE UP

## 2022-01-01 PROCEDURE — 99214 OFFICE O/P EST MOD 30 MIN: CPT

## 2022-01-01 PROCEDURE — 99223 1ST HOSP IP/OBS HIGH 75: CPT | Mod: GC

## 2022-01-01 PROCEDURE — 93005 ELECTROCARDIOGRAM TRACING: CPT

## 2022-01-01 PROCEDURE — 85027 COMPLETE CBC AUTOMATED: CPT

## 2022-01-01 PROCEDURE — 99233 SBSQ HOSP IP/OBS HIGH 50: CPT

## 2022-01-01 PROCEDURE — 99233 SBSQ HOSP IP/OBS HIGH 50: CPT | Mod: GC

## 2022-01-01 PROCEDURE — 83735 ASSAY OF MAGNESIUM: CPT

## 2022-01-01 PROCEDURE — 99285 EMERGENCY DEPT VISIT HI MDM: CPT | Mod: 25

## 2022-01-01 PROCEDURE — 99232 SBSQ HOSP IP/OBS MODERATE 35: CPT | Mod: GC

## 2022-01-01 PROCEDURE — 84100 ASSAY OF PHOSPHORUS: CPT

## 2022-01-01 PROCEDURE — 85025 COMPLETE CBC W/AUTO DIFF WBC: CPT

## 2022-01-01 PROCEDURE — 71045 X-RAY EXAM CHEST 1 VIEW: CPT | Mod: 26

## 2022-01-01 PROCEDURE — 97116 GAIT TRAINING THERAPY: CPT

## 2022-01-01 PROCEDURE — 71045 X-RAY EXAM CHEST 1 VIEW: CPT

## 2022-01-01 PROCEDURE — 84443 ASSAY THYROID STIM HORMONE: CPT

## 2022-01-01 PROCEDURE — 82550 ASSAY OF CK (CPK): CPT

## 2022-01-01 PROCEDURE — 97161 PT EVAL LOW COMPLEX 20 MIN: CPT

## 2022-01-01 PROCEDURE — 99239 HOSP IP/OBS DSCHRG MGMT >30: CPT | Mod: GC

## 2022-01-01 PROCEDURE — 82962 GLUCOSE BLOOD TEST: CPT

## 2022-01-01 PROCEDURE — 81001 URINALYSIS AUTO W/SCOPE: CPT

## 2022-01-01 PROCEDURE — 97530 THERAPEUTIC ACTIVITIES: CPT

## 2022-01-01 PROCEDURE — 70450 CT HEAD/BRAIN W/O DYE: CPT

## 2022-01-01 PROCEDURE — 84484 ASSAY OF TROPONIN QUANT: CPT

## 2022-01-01 PROCEDURE — 83605 ASSAY OF LACTIC ACID: CPT

## 2022-01-01 PROCEDURE — 93010 ELECTROCARDIOGRAM REPORT: CPT

## 2022-01-01 PROCEDURE — 99284 EMERGENCY DEPT VISIT MOD MDM: CPT

## 2022-01-01 PROCEDURE — 97110 THERAPEUTIC EXERCISES: CPT

## 2022-01-01 PROCEDURE — 82553 CREATINE MB FRACTION: CPT

## 2022-01-01 PROCEDURE — 84145 PROCALCITONIN (PCT): CPT

## 2022-01-01 PROCEDURE — 97535 SELF CARE MNGMENT TRAINING: CPT

## 2022-01-01 PROCEDURE — 97162 PT EVAL MOD COMPLEX 30 MIN: CPT

## 2022-01-01 PROCEDURE — 93971 EXTREMITY STUDY: CPT | Mod: 26,RT

## 2022-01-01 PROCEDURE — 96361 HYDRATE IV INFUSION ADD-ON: CPT

## 2022-01-01 PROCEDURE — 96374 THER/PROPH/DIAG INJ IV PUSH: CPT

## 2022-01-01 PROCEDURE — 87635 SARS-COV-2 COVID-19 AMP PRB: CPT

## 2022-01-01 PROCEDURE — 80048 BASIC METABOLIC PNL TOTAL CA: CPT

## 2022-01-01 PROCEDURE — 36415 COLL VENOUS BLD VENIPUNCTURE: CPT

## 2022-01-01 PROCEDURE — 70450 CT HEAD/BRAIN W/O DYE: CPT | Mod: 26

## 2022-01-01 PROCEDURE — 83036 HEMOGLOBIN GLYCOSYLATED A1C: CPT

## 2022-01-01 PROCEDURE — 80053 COMPREHEN METABOLIC PANEL: CPT

## 2022-01-01 RX ORDER — LOSARTAN POTASSIUM 100 MG/1
1 TABLET, FILM COATED ORAL
Qty: 0 | Refills: 0 | DISCHARGE

## 2022-01-01 RX ORDER — LABETALOL HCL 100 MG
100 TABLET ORAL ONCE
Refills: 0 | Status: DISCONTINUED | OUTPATIENT
Start: 2022-01-01 | End: 2022-01-01

## 2022-01-01 RX ORDER — ACETAMINOPHEN 500 MG
650 TABLET ORAL ONCE
Refills: 0 | Status: COMPLETED | OUTPATIENT
Start: 2022-01-01 | End: 2022-01-01

## 2022-01-01 RX ORDER — CARBIDOPA AND LEVODOPA 25; 100 MG/1; MG/1
1 TABLET ORAL
Qty: 0 | Refills: 0 | DISCHARGE

## 2022-01-01 RX ORDER — ENOXAPARIN SODIUM 100 MG/ML
40 INJECTION SUBCUTANEOUS EVERY 24 HOURS
Refills: 0 | Status: DISCONTINUED | OUTPATIENT
Start: 2022-01-01 | End: 2022-01-01

## 2022-01-01 RX ORDER — LOSARTAN POTASSIUM 100 MG/1
25 TABLET, FILM COATED ORAL ONCE
Refills: 0 | Status: COMPLETED | OUTPATIENT
Start: 2022-01-01 | End: 2022-01-01

## 2022-01-01 RX ORDER — CARBIDOPA AND LEVODOPA 25; 100 MG/1; MG/1
2 TABLET ORAL AT BEDTIME
Refills: 0 | Status: DISCONTINUED | OUTPATIENT
Start: 2022-01-01 | End: 2022-01-01

## 2022-01-01 RX ORDER — FOLIC ACID 0.8 MG
0 TABLET ORAL
Qty: 0 | Refills: 0 | DISCHARGE

## 2022-01-01 RX ORDER — LOSARTAN POTASSIUM 100 MG/1
25 TABLET, FILM COATED ORAL EVERY 24 HOURS
Refills: 0 | Status: DISCONTINUED | OUTPATIENT
Start: 2022-01-01 | End: 2022-01-01

## 2022-01-01 RX ORDER — ATORVASTATIN CALCIUM 10 MG/1
10 TABLET, FILM COATED ORAL
Refills: 0 | Status: ACTIVE | COMMUNITY
Start: 2022-01-01

## 2022-01-01 RX ORDER — ASPIRIN/CALCIUM CARB/MAGNESIUM 324 MG
1 TABLET ORAL
Qty: 0 | Refills: 0 | DISCHARGE

## 2022-01-01 RX ORDER — SODIUM CHLORIDE 9 MG/ML
1000 INJECTION INTRAMUSCULAR; INTRAVENOUS; SUBCUTANEOUS ONCE
Refills: 0 | Status: COMPLETED | OUTPATIENT
Start: 2022-01-01 | End: 2022-01-01

## 2022-01-01 RX ORDER — CARBIDOPA AND LEVODOPA 25; 100 MG/1; MG/1
1 TABLET ORAL AT BEDTIME
Refills: 0 | Status: DISCONTINUED | OUTPATIENT
Start: 2022-01-01 | End: 2022-01-01

## 2022-01-01 RX ORDER — DIPHENHYDRAMINE HCL 50 MG
25 CAPSULE ORAL ONCE
Refills: 0 | Status: COMPLETED | OUTPATIENT
Start: 2022-01-01 | End: 2022-01-01

## 2022-01-01 RX ORDER — INSULIN LISPRO 100/ML
VIAL (ML) SUBCUTANEOUS
Refills: 0 | Status: DISCONTINUED | OUTPATIENT
Start: 2022-01-01 | End: 2022-01-01

## 2022-01-01 RX ORDER — LISINOPRIL/HYDROCHLOROTHIAZIDE 10-12.5 MG
0 TABLET ORAL
Qty: 0 | Refills: 0 | DISCHARGE

## 2022-01-01 RX ORDER — CARBIDOPA AND LEVODOPA 25; 100 MG/1; MG/1
2 TABLET ORAL THREE TIMES A DAY
Refills: 0 | Status: DISCONTINUED | OUTPATIENT
Start: 2022-01-01 | End: 2022-01-01

## 2022-01-01 RX ORDER — GLUCAGON INJECTION, SOLUTION 0.5 MG/.1ML
1 INJECTION, SOLUTION SUBCUTANEOUS ONCE
Refills: 0 | Status: DISCONTINUED | OUTPATIENT
Start: 2022-01-01 | End: 2022-01-01

## 2022-01-01 RX ORDER — LOSARTAN POTASSIUM 25 MG/1
25 TABLET, FILM COATED ORAL
Qty: 90 | Refills: 0 | Status: ACTIVE | COMMUNITY
Start: 2022-01-01

## 2022-01-01 RX ORDER — LABETALOL HCL 100 MG
100 TABLET ORAL ONCE
Refills: 0 | Status: COMPLETED | OUTPATIENT
Start: 2022-01-01 | End: 2022-01-01

## 2022-01-01 RX ORDER — DEXTROSE 50 % IN WATER 50 %
15 SYRINGE (ML) INTRAVENOUS ONCE
Refills: 0 | Status: DISCONTINUED | OUTPATIENT
Start: 2022-01-01 | End: 2022-01-01

## 2022-01-01 RX ORDER — LANOLIN ALCOHOL/MO/W.PET/CERES
1 CREAM (GRAM) TOPICAL AT BEDTIME
Refills: 0 | Status: DISCONTINUED | OUTPATIENT
Start: 2022-01-01 | End: 2022-01-01

## 2022-01-01 RX ORDER — POTASSIUM CHLORIDE 20 MEQ
20 PACKET (EA) ORAL
Refills: 0 | Status: COMPLETED | OUTPATIENT
Start: 2022-01-01 | End: 2022-01-01

## 2022-01-01 RX ORDER — CARBIDOPA AND LEVODOPA 25; 100 MG/1; MG/1
2 TABLET ORAL
Qty: 0 | Refills: 0 | DISCHARGE

## 2022-01-01 RX ORDER — MAGNESIUM SULFATE 500 MG/ML
2 VIAL (ML) INJECTION ONCE
Refills: 0 | Status: COMPLETED | OUTPATIENT
Start: 2022-01-01 | End: 2022-01-01

## 2022-01-01 RX ORDER — SODIUM CHLORIDE 9 MG/ML
1000 INJECTION, SOLUTION INTRAVENOUS
Refills: 0 | Status: DISCONTINUED | OUTPATIENT
Start: 2022-01-01 | End: 2022-01-01

## 2022-01-01 RX ORDER — MUPIROCIN 20 MG/G
1 OINTMENT TOPICAL
Qty: 0 | Refills: 0 | DISCHARGE
Start: 2022-01-01

## 2022-01-01 RX ORDER — LOSARTAN POTASSIUM 100 MG/1
100 TABLET, FILM COATED ORAL DAILY
Refills: 0 | Status: DISCONTINUED | OUTPATIENT
Start: 2022-01-01 | End: 2022-01-01

## 2022-01-01 RX ORDER — POTASSIUM CHLORIDE 20 MEQ
20 PACKET (EA) ORAL ONCE
Refills: 0 | Status: COMPLETED | OUTPATIENT
Start: 2022-01-01 | End: 2022-01-01

## 2022-01-01 RX ORDER — DEXTROSE 50 % IN WATER 50 %
25 SYRINGE (ML) INTRAVENOUS ONCE
Refills: 0 | Status: DISCONTINUED | OUTPATIENT
Start: 2022-01-01 | End: 2022-01-01

## 2022-01-01 RX ORDER — ESCITALOPRAM OXALATE 10 MG/1
10 TABLET, FILM COATED ORAL EVERY 24 HOURS
Refills: 0 | Status: DISCONTINUED | OUTPATIENT
Start: 2022-01-01 | End: 2022-01-01

## 2022-01-01 RX ORDER — LOSARTAN POTASSIUM 100 MG/1
50 TABLET, FILM COATED ORAL DAILY
Refills: 0 | Status: DISCONTINUED | OUTPATIENT
Start: 2022-01-01 | End: 2022-01-01

## 2022-01-01 RX ORDER — ESCITALOPRAM OXALATE 10 MG/1
1 TABLET, FILM COATED ORAL
Qty: 0 | Refills: 0 | DISCHARGE

## 2022-01-01 RX ORDER — MUPIROCIN 20 MG/G
1 OINTMENT TOPICAL EVERY 12 HOURS
Refills: 0 | Status: DISCONTINUED | OUTPATIENT
Start: 2022-01-01 | End: 2022-01-01

## 2022-01-01 RX ORDER — DEXTROSE 50 % IN WATER 50 %
12.5 SYRINGE (ML) INTRAVENOUS ONCE
Refills: 0 | Status: DISCONTINUED | OUTPATIENT
Start: 2022-01-01 | End: 2022-01-01

## 2022-01-01 RX ORDER — TRAZODONE HCL 50 MG
0 TABLET ORAL
Qty: 0 | Refills: 0 | DISCHARGE

## 2022-01-01 RX ORDER — ENOXAPARIN SODIUM 100 MG/ML
40 INJECTION SUBCUTANEOUS
Qty: 0 | Refills: 0 | DISCHARGE
Start: 2022-01-01

## 2022-01-01 RX ORDER — CARBIDOPA AND LEVODOPA 25; 100 MG/1; MG/1
0 TABLET ORAL
Qty: 0 | Refills: 0 | DISCHARGE

## 2022-01-01 RX ORDER — POLYETHYLENE GLYCOL 3350 17 G/17G
0 POWDER, FOR SOLUTION ORAL
Qty: 0 | Refills: 0 | DISCHARGE

## 2022-01-01 RX ORDER — IMMUNE GLOBULIN (HUMAN) 10 G/100ML
90 INJECTION INTRAVENOUS; SUBCUTANEOUS ONCE
Refills: 0 | Status: COMPLETED | OUTPATIENT
Start: 2022-01-01 | End: 2022-01-01

## 2022-01-01 RX ORDER — POTASSIUM CHLORIDE 20 MEQ
20 PACKET (EA) ORAL ONCE
Refills: 0 | Status: DISCONTINUED | OUTPATIENT
Start: 2022-01-01 | End: 2022-01-01

## 2022-01-01 RX ADMIN — CARBIDOPA AND LEVODOPA 2 TABLET(S): 25; 100 TABLET ORAL at 20:38

## 2022-01-01 RX ADMIN — ESCITALOPRAM OXALATE 10 MILLIGRAM(S): 10 TABLET, FILM COATED ORAL at 06:26

## 2022-01-01 RX ADMIN — Medication 0.5 MILLIGRAM(S): at 23:04

## 2022-01-01 RX ADMIN — CARBIDOPA AND LEVODOPA 2 TABLET(S): 25; 100 TABLET ORAL at 15:33

## 2022-01-01 RX ADMIN — CARBIDOPA AND LEVODOPA 2 TABLET(S): 25; 100 TABLET ORAL at 21:56

## 2022-01-01 RX ADMIN — Medication 100 MILLIGRAM(S): at 21:54

## 2022-01-01 RX ADMIN — CARBIDOPA AND LEVODOPA 2 TABLET(S): 25; 100 TABLET ORAL at 01:00

## 2022-01-01 RX ADMIN — LOSARTAN POTASSIUM 100 MILLIGRAM(S): 100 TABLET, FILM COATED ORAL at 06:42

## 2022-01-01 RX ADMIN — CARBIDOPA AND LEVODOPA 2 TABLET(S): 25; 100 TABLET ORAL at 13:35

## 2022-01-01 RX ADMIN — Medication 1 MILLIGRAM(S): at 21:55

## 2022-01-01 RX ADMIN — LOSARTAN POTASSIUM 25 MILLIGRAM(S): 100 TABLET, FILM COATED ORAL at 06:35

## 2022-01-01 RX ADMIN — CARBIDOPA AND LEVODOPA 2 TABLET(S): 25; 100 TABLET ORAL at 22:16

## 2022-01-01 RX ADMIN — Medication 20 MILLIEQUIVALENT(S): at 11:17

## 2022-01-01 RX ADMIN — CARBIDOPA AND LEVODOPA 2 TABLET(S): 25; 100 TABLET ORAL at 00:08

## 2022-01-01 RX ADMIN — ESCITALOPRAM OXALATE 10 MILLIGRAM(S): 10 TABLET, FILM COATED ORAL at 06:00

## 2022-01-01 RX ADMIN — LOSARTAN POTASSIUM 100 MILLIGRAM(S): 100 TABLET, FILM COATED ORAL at 06:26

## 2022-01-01 RX ADMIN — ESCITALOPRAM OXALATE 10 MILLIGRAM(S): 10 TABLET, FILM COATED ORAL at 05:37

## 2022-01-01 RX ADMIN — CARBIDOPA AND LEVODOPA 2 TABLET(S): 25; 100 TABLET ORAL at 23:04

## 2022-01-01 RX ADMIN — CARBIDOPA AND LEVODOPA 2 TABLET(S): 25; 100 TABLET ORAL at 06:31

## 2022-01-01 RX ADMIN — Medication 1 MILLIGRAM(S): at 21:54

## 2022-01-01 RX ADMIN — CARBIDOPA AND LEVODOPA 2 TABLET(S): 25; 100 TABLET ORAL at 06:25

## 2022-01-01 RX ADMIN — CARBIDOPA AND LEVODOPA 2 TABLET(S): 25; 100 TABLET ORAL at 22:51

## 2022-01-01 RX ADMIN — Medication 0.5 MILLIGRAM(S): at 01:45

## 2022-01-01 RX ADMIN — LOSARTAN POTASSIUM 25 MILLIGRAM(S): 100 TABLET, FILM COATED ORAL at 15:50

## 2022-01-01 RX ADMIN — Medication 25 MILLIGRAM(S): at 17:53

## 2022-01-01 RX ADMIN — Medication 650 MILLIGRAM(S): at 17:53

## 2022-01-01 RX ADMIN — CARBIDOPA AND LEVODOPA 2 TABLET(S): 25; 100 TABLET ORAL at 00:30

## 2022-01-01 RX ADMIN — Medication 20 MILLIEQUIVALENT(S): at 09:27

## 2022-01-01 RX ADMIN — CARBIDOPA AND LEVODOPA 2 TABLET(S): 25; 100 TABLET ORAL at 13:32

## 2022-01-01 RX ADMIN — Medication 1 MILLIGRAM(S): at 21:12

## 2022-01-01 RX ADMIN — CARBIDOPA AND LEVODOPA 2 TABLET(S): 25; 100 TABLET ORAL at 13:31

## 2022-01-01 RX ADMIN — SODIUM CHLORIDE 1000 MILLILITER(S): 9 INJECTION INTRAMUSCULAR; INTRAVENOUS; SUBCUTANEOUS at 13:33

## 2022-01-01 RX ADMIN — Medication 25 GRAM(S): at 13:11

## 2022-01-01 RX ADMIN — Medication 1 MILLIGRAM(S): at 22:52

## 2022-01-01 RX ADMIN — Medication 20 MILLIEQUIVALENT(S): at 13:05

## 2022-01-01 RX ADMIN — ESCITALOPRAM OXALATE 10 MILLIGRAM(S): 10 TABLET, FILM COATED ORAL at 06:41

## 2022-01-01 RX ADMIN — CARBIDOPA AND LEVODOPA 2 TABLET(S): 25; 100 TABLET ORAL at 06:01

## 2022-01-01 RX ADMIN — CARBIDOPA AND LEVODOPA 2 TABLET(S): 25; 100 TABLET ORAL at 14:46

## 2022-01-01 RX ADMIN — LOSARTAN POTASSIUM 50 MILLIGRAM(S): 100 TABLET, FILM COATED ORAL at 06:31

## 2022-01-01 RX ADMIN — SODIUM CHLORIDE 2000 MILLILITER(S): 9 INJECTION INTRAMUSCULAR; INTRAVENOUS; SUBCUTANEOUS at 12:20

## 2022-01-01 RX ADMIN — ESCITALOPRAM OXALATE 10 MILLIGRAM(S): 10 TABLET, FILM COATED ORAL at 06:31

## 2022-01-01 RX ADMIN — LOSARTAN POTASSIUM 25 MILLIGRAM(S): 100 TABLET, FILM COATED ORAL at 06:00

## 2022-01-01 RX ADMIN — ESCITALOPRAM OXALATE 10 MILLIGRAM(S): 10 TABLET, FILM COATED ORAL at 06:34

## 2022-01-01 RX ADMIN — CARBIDOPA AND LEVODOPA 2 TABLET(S): 25; 100 TABLET ORAL at 07:29

## 2022-01-01 RX ADMIN — LOSARTAN POTASSIUM 25 MILLIGRAM(S): 100 TABLET, FILM COATED ORAL at 17:23

## 2022-01-01 RX ADMIN — Medication 1 MILLIGRAM(S): at 23:05

## 2022-01-01 RX ADMIN — CARBIDOPA AND LEVODOPA 2 TABLET(S): 25; 100 TABLET ORAL at 07:00

## 2022-01-01 RX ADMIN — CARBIDOPA AND LEVODOPA 2 TABLET(S): 25; 100 TABLET ORAL at 06:38

## 2022-01-01 RX ADMIN — LOSARTAN POTASSIUM 25 MILLIGRAM(S): 100 TABLET, FILM COATED ORAL at 05:37

## 2022-01-05 ENCOUNTER — APPOINTMENT (OUTPATIENT)
Dept: OPHTHALMOLOGY | Facility: CLINIC | Age: 87
End: 2022-01-05
Payer: MEDICARE

## 2022-01-05 ENCOUNTER — NON-APPOINTMENT (OUTPATIENT)
Age: 87
End: 2022-01-05

## 2022-01-05 PROCEDURE — 92134 CPTRZ OPH DX IMG PST SGM RTA: CPT

## 2022-01-05 PROCEDURE — 92012 INTRM OPH EXAM EST PATIENT: CPT

## 2022-01-19 ENCOUNTER — APPOINTMENT (OUTPATIENT)
Dept: OPHTHALMOLOGY | Facility: CLINIC | Age: 87
End: 2022-01-19
Payer: COMMERCIAL

## 2022-01-19 ENCOUNTER — NON-APPOINTMENT (OUTPATIENT)
Age: 87
End: 2022-01-19

## 2022-01-19 PROCEDURE — 67028 INJECTION EYE DRUG: CPT | Mod: RT

## 2022-01-19 PROCEDURE — 92134 CPTRZ OPH DX IMG PST SGM RTA: CPT

## 2022-01-20 ENCOUNTER — APPOINTMENT (OUTPATIENT)
Dept: ENDOCRINOLOGY | Facility: CLINIC | Age: 87
End: 2022-01-20
Payer: COMMERCIAL

## 2022-01-20 VITALS
RESPIRATION RATE: 18 BRPM | WEIGHT: 194 LBS | DIASTOLIC BLOOD PRESSURE: 90 MMHG | HEIGHT: 75 IN | BODY MASS INDEX: 24.12 KG/M2 | SYSTOLIC BLOOD PRESSURE: 142 MMHG | HEART RATE: 84 BPM

## 2022-01-20 PROCEDURE — 99214 OFFICE O/P EST MOD 30 MIN: CPT

## 2022-01-24 LAB
ALBUMIN SERPL ELPH-MCNC: 4.3 G/DL
ALP BLD-CCNC: 82 U/L
ALT SERPL-CCNC: 12 U/L
ANION GAP SERPL CALC-SCNC: 13 MMOL/L
AST SERPL-CCNC: 19 U/L
BILIRUB SERPL-MCNC: 0.3 MG/DL
BUN SERPL-MCNC: 10 MG/DL
CALCIUM SERPL-MCNC: 9.3 MG/DL
CHLORIDE SERPL-SCNC: 105 MMOL/L
CHOLEST SERPL-MCNC: 148 MG/DL
CO2 SERPL-SCNC: 27 MMOL/L
CREAT SERPL-MCNC: 1.11 MG/DL
CREAT SPEC-SCNC: 331 MG/DL
ESTIMATED AVERAGE GLUCOSE: 134 MG/DL
GLUCOSE SERPL-MCNC: 111 MG/DL
HBA1C MFR BLD HPLC: 6.3 %
HDLC SERPL-MCNC: 53 MG/DL
LDLC SERPL CALC-MCNC: 86 MG/DL
MICROALBUMIN 24H UR DL<=1MG/L-MCNC: 7 MG/DL
MICROALBUMIN/CREAT 24H UR-RTO: 21 MG/G
NONHDLC SERPL-MCNC: 95 MG/DL
POTASSIUM SERPL-SCNC: 3.9 MMOL/L
PROT SERPL-MCNC: 7.9 G/DL
SODIUM SERPL-SCNC: 144 MMOL/L
T4 FREE SERPL-MCNC: 1.2 NG/DL
TRIGL SERPL-MCNC: 43 MG/DL
TSH SERPL-ACNC: 1.92 UIU/ML

## 2022-01-24 NOTE — REVIEW OF SYSTEMS
[Fatigue] : fatigue [SOB on Exertion] : shortness of breath on exertion [Muscle Weakness] : muscle weakness [Dry Skin] : dry skin [Difficulty Walking] : difficulty walking [Tremors] : tremors [Poor Balance] : poor balance [Anxiety] : anxiety [Pain/Numbness of Digits] : pain/numbness of digits [Decreased Appetite] : appetite not decreased [Recent Weight Gain (___ Lbs)] : no recent weight gain [Recent Weight Loss (___ Lbs)] : no recent weight loss [Fever] : no fever [Chills] : no chills [Dry Eyes] : no dryness [Eyes Itch] : no itch [Hearing Loss] : no hearing loss  [Chest Pain] : no chest pain [Leg Claudication] : no leg claudication [Palpitations] : no palpitations [Lower Ext Edema] : no lower extremity edema [Shortness Of Breath] : no shortness of breath [Cough] : no cough [Wheezing] : no wheezing [Nausea] : no nausea [Heartburn] : no heartburn [Diarrhea] : no diarrhea [Polyuria] : no polyuria [Dysuria] : no dysuria [Joint Pain] : no joint pain [Myalgia] : no myalgia  [Joint Stiffness] : no joint stiffness [Ulcer] : no ulcer [Headaches] : no headaches [Depression] : no depression [Stress] : no stress [Polydipsia] : no polydipsia [Cold Intolerance] : no cold intolerance [Heat Intolerance] : no heat intolerance [Easy Bleeding] : no ~M tendency for easy bleeding [Easy Bruising] : no tendency for easy bruising [FreeTextEntry3] : See comments in the HPI re: recent changes in vision [FreeTextEntry4] : Denies any dysphagia for solid foods or coughing after drinking liquids, but has trouble swallowing pills [FreeTextEntry7] : Constipation is under control with Miralax every other day [FreeTextEntry8] : Nocturia 4X/nighit [FreeTextEntry9] : Has significant difficulty getting up out of a chair--needs to "rock" back and forth [de-identified] : Still has not done the Mohs surgery for the lesion on the right side of his nose

## 2022-01-24 NOTE — HISTORY OF PRESENT ILLNESS
[FreeTextEntry1] : 86-year-old man who is followed for type 2 DM and hypertension.  His diabetes was diagnosed in 2016 when his A1c level (which was being followed because of pre-diabetes) christoph to 6.7%.  HIs glycemic control has been satisfactory on dietary modification alone.  He is also followed for a non-toxic multinodular goiter (with the dominant nodules having been negative on biopsy) and hypercholesterolemia (previously but not currently on statin therapy).  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\par Will be seeing his Parkinsons MD (Dr. Morrow) next week.\par Saw Dr. Good for evaluation of his exertional chest tightness and dyspnea, but stress test and echo were negative.\par \alexandrea Still feels that his Parkinsons is gradually worsening--mostly worsening gait instability.  Has fallen twice--once when he was trying to get back onto his bed (and "missed" it), once outside the house (which resulted in an injury to his L wrist).  He also needs to "rock" repeatedly in order to get up from a sitting position.  [facility] has sent a physical therapist who specializes in Parkinsons pts to work with him.\par He has not been having any difficulty with swallowing, but he has noted worsening cognitive function.\par He saw Dr. Wayne about the elevated BP noted at his last visit here, and has been started on losartan 25 mg/day.\par He is still receiving IVIG, but this has been decreased to every 4 weeks.  He has minimal sensory symptoms in his feet, and does not think that his leg strength deteriorates as the IVIG wears off.\par Is still receiving monthly intravitreal injections in his R eye.  Feels that his visual acuity in both eyes is worse.  Still has large floaters in his R eye, but denies any metamorphopsia.  \par Occasional fingersticks done by the IVIG nurse are "in the 90s."\par Still eating 2 meals a day:\par --Breakfast is an English muffin or toast\par --Skipping lunch\par --Starch at supper is pasta--but in the form of lasagne, ravioli, manicotti, etc\par --Admits to "al lot of sweets"--mostly cookies, but often has non-sugar-free Snapple fruit drinks.  Still having ice cream most nights after supper\par \par \par \par Still receiving IVIG infusions every 3 weeks. New neurologist who will be following the CIDP will be James Jamison ([phone])\par Fingersticks done by the nurse who supervises his IVIG have been in the  range (fasting).  BPs checked by her have apparently been OK.\par Will be seeing Dr. Aragon for follow-up of the macular edema in his R eye next week.  His last visit was 2 months ago and he required an intravitreal injection.  He thinks that his vision in that eye is worse.  Has "floaters" in that eye but denies metamorphopsia.\par Current diet:\par --Breakfast is either an English muffin, a blueberry muffin or a bowl of fruit salad\par --Usually skips lunch\par --Main starch at supper is pasta--\par --Will have ice cream most nights after supper--"a good sized bowl"--and sometimes a few cookies

## 2022-01-24 NOTE — ADDENDUM
[FreeTextEntry1] : Spoke with the pt on 1/24/22 regarding the results of the bloodwork drawn on the day of his visit:\par \par FBS OK at 111, but the A1c level has risen to 6.3%--almost certainly due to the large amount of concentrated sweets which he has been indulging in--the non-sugar-free Snapple, juices, cake, cookies, ice cream.  He says "I know I've been overdoing it."\par LDL-cholesterol mildly above target at 86 mg%.  Will hold off starting a statin (which he has taken in the past given his large number of meds and the fact that he has room for dietary modification--(i.e. curtailing his intake of baked goods, etc)\par CMP WNL\par TSH normal at 1.92 uU/ml\par Urine microalbumin ratio negative at 21 (normal < 30)

## 2022-01-24 NOTE — PHYSICAL EXAM
[Alert] : alert [Well Nourished] : well nourished [No Acute Distress] : no acute distress [Normal Sclera/Conjunctiva] : normal sclera/conjunctiva [EOMI] : extra ocular movement intact [PERRL] : pupils equal, round and reactive to light [No Proptosis] : no proptosis [No Lid Lag] : no lid lag [Normal Outer Ear/Nose] : the ears and nose were normal in appearance [Normal Hearing] : hearing was normal [No Neck Mass] : no neck mass was observed [No LAD] : no lymphadenopathy [Clear to Auscultation] : lungs were clear to auscultation bilaterally [No Murmurs] : no murmurs [Normal Rate] : heart rate was normal [Regular Rhythm] : with a regular rhythm [Carotids Normal] : carotid pulses were normal with no bruits [No Edema] : no peripheral edema [Not Tender] : non-tender [Soft] : abdomen soft [No HSM] : no hepato-splenomegaly [Normal Supraclavicular Nodes] : no supraclavicular lymphadenopathy [Normal Anterior Cervical Nodes] : no anterior cervical lymphadenopathy [No CVA Tenderness] : no ~M costovertebral angle tenderness [No Joint Swelling] : no joint swelling seen [Normal] : normal [2+] : 2+ in the dorsalis pedis [Vibration Dec.] : diminished vibratory sensation at the level of the toes [Position Sense Dec.] : diminished position sense at the level of the toes [#4 Diminished] : number 4 was diminished [#5 Diminished] : number 5 was diminished [#7 Diminished] : number 7 was diminished [#8 Diminished] : number 8 was diminished [Normal Affect] : the affect was normal [Normal Mood] : the mood was normal [1+] : 1+ in the posterior tibialis [#1 Diminished] : number 1 was diminished [#2 Diminished] : number 2 was diminished [#3 Diminished] : number 3 was diminished [#6 Diminished] : number 6 was diminished [#9 Diminished] : number 9 was diminished [#10 Diminished] : number 10 was diminished [Kyphosis] : no kyphosis present [Acanthosis Nigricans] : no acanthosis nigricans [Foot Ulcers] : no foot ulcers [Delayed in the Right Toes] : normal in the toes [Full ROM] : with limited range of motion [Swelling] : not swollen [Tenderness] : not tender [Erythema] : not erythematous [Delayed in the Left Toes] : normal in the toes [de-identified] : Moderately bradykinetic.  Speech is fairly clear [de-identified] : Dense corneal arcus [de-identified] : Thyroid approximately 30 gm in size, firm and lobular in consistency [de-identified] : DP pulses 3+ bilaterally [de-identified] : Unable to rise from a chair without "rocking" forward a few times, and using his hands for assistance.  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  Vibratory sensation absent over the toes and malleoli

## 2022-02-02 ENCOUNTER — APPOINTMENT (OUTPATIENT)
Dept: OPHTHALMOLOGY | Facility: CLINIC | Age: 87
End: 2022-02-02
Payer: COMMERCIAL

## 2022-02-02 ENCOUNTER — NON-APPOINTMENT (OUTPATIENT)
Age: 87
End: 2022-02-02

## 2022-02-02 PROCEDURE — 92012 INTRM OPH EXAM EST PATIENT: CPT

## 2022-02-08 ENCOUNTER — NON-APPOINTMENT (OUTPATIENT)
Age: 87
End: 2022-02-08

## 2022-02-08 ENCOUNTER — APPOINTMENT (OUTPATIENT)
Dept: OPHTHALMOLOGY | Facility: CLINIC | Age: 87
End: 2022-02-08
Payer: COMMERCIAL

## 2022-02-08 PROCEDURE — 66821 AFTER CATARACT LASER SURGERY: CPT | Mod: RT

## 2022-05-02 ENCOUNTER — NON-APPOINTMENT (OUTPATIENT)
Age: 87
End: 2022-05-02

## 2022-05-02 ENCOUNTER — APPOINTMENT (OUTPATIENT)
Dept: OPHTHALMOLOGY | Facility: CLINIC | Age: 87
End: 2022-05-02
Payer: MEDICARE

## 2022-05-02 PROCEDURE — 67028 INJECTION EYE DRUG: CPT | Mod: RT,79

## 2022-05-02 PROCEDURE — 92134 CPTRZ OPH DX IMG PST SGM RTA: CPT

## 2022-05-18 ENCOUNTER — APPOINTMENT (OUTPATIENT)
Dept: ENDOCRINOLOGY | Facility: CLINIC | Age: 87
End: 2022-05-18
Payer: MEDICARE

## 2022-05-18 VITALS
HEIGHT: 75.2 IN | TEMPERATURE: 98.2 F | DIASTOLIC BLOOD PRESSURE: 81 MMHG | OXYGEN SATURATION: 95 % | WEIGHT: 193 LBS | HEART RATE: 85 BPM | BODY MASS INDEX: 24 KG/M2 | SYSTOLIC BLOOD PRESSURE: 131 MMHG

## 2022-05-18 DIAGNOSIS — R35.1 NOCTURIA: ICD-10-CM

## 2022-05-18 PROCEDURE — 99214 OFFICE O/P EST MOD 30 MIN: CPT

## 2022-05-22 PROBLEM — R35.1 NOCTURIA: Status: ACTIVE | Noted: 2022-05-18

## 2022-05-22 NOTE — ASSESSMENT
[FreeTextEntry1] : 1) Type 2 DM:  Glycemic control remains excellent by A1c level despite his reliance on pasta as the starch at supper (with portions mildly excessive) and his intake of cake or ice cream after supper.  \par --Diet was reinforced, but I did not lean on him too heavily given the excellent A1c level and his other medical issues \par \par 2) Hypercholesterolemia:  LDL-cholesterol level had previously been under 70 mg% without statin therapy.  It is possible that his intake of saturated fat has increased--i.e. desserts (baked goods and ice cream) and possibly an increase in beef intake.\par --If next LDL-cholesterol level is still > 70 mg% will start a low dose of atorvastatin\par \par 3) Hypertension:  BP is satisfactory at today's visit, and readings checked by the IVIG nurse at home visits, have apparently been in target range\par --Lisinopril was previously stopped because of problems with orthostasis.  Will need to consider restarting\par \par 4) Constipation:  Suggested to the pt that he take the fiber supplements daily, the Miralax only PRN\par \par See for follow-up in 4 months.  CMP, lipids, A1c, microalb before the visit [FreeTextEntry2] : Diet

## 2022-05-22 NOTE — PHYSICAL EXAM
[Alert] : alert [Well Nourished] : well nourished [No Acute Distress] : no acute distress [Normal Sclera/Conjunctiva] : normal sclera/conjunctiva [EOMI] : extra ocular movement intact [PERRL] : pupils equal, round and reactive to light [No Proptosis] : no proptosis [No Lid Lag] : no lid lag [Normal Outer Ear/Nose] : the ears and nose were normal in appearance [Normal Hearing] : hearing was normal [No Neck Mass] : no neck mass was observed [No LAD] : no lymphadenopathy [Clear to Auscultation] : lungs were clear to auscultation bilaterally [No Murmurs] : no murmurs [Normal Rate] : heart rate was normal [Regular Rhythm] : with a regular rhythm [Carotids Normal] : carotid pulses were normal with no bruits [No Edema] : no peripheral edema [Not Tender] : non-tender [Soft] : abdomen soft [No HSM] : no hepato-splenomegaly [Normal Supraclavicular Nodes] : no supraclavicular lymphadenopathy [Normal Anterior Cervical Nodes] : no anterior cervical lymphadenopathy [No CVA Tenderness] : no ~M costovertebral angle tenderness [No Joint Swelling] : no joint swelling seen [Normal] : normal [2+] : 2+ in the dorsalis pedis [Vibration Dec.] : diminished vibratory sensation at the level of the toes [Position Sense Dec.] : diminished position sense at the level of the toes [#1 Diminished] : number 1 was diminished [#2 Diminished] : number 2 was diminished [#3 Diminished] : number 3 was diminished [#4 Diminished] : number 4 was diminished [#5 Diminished] : number 5 was diminished [#6 Diminished] : number 6 was diminished [#9 Diminished] : number 9 was diminished [#10 Diminished] : number 10 was diminished [Normal Affect] : the affect was normal [Normal Mood] : the mood was normal [Kyphosis] : no kyphosis present [Acanthosis Nigricans] : no acanthosis nigricans [Foot Ulcers] : no foot ulcers [Delayed in the Right Toes] : normal in the toes [Full ROM] : with limited range of motion [Swelling] : not swollen [Tenderness] : not tender [Erythema] : not erythematous [Delayed in the Left Toes] : normal in the toes [#7 Diminished] : number 7 was normal [#8 Diminished] : number 8 was normal [de-identified] : Moderately bradykinetic.  Speech is fairly clear [de-identified] : Dense corneal arcus [de-identified] : Healing Mohs incisions on the R side of his nose and the R preauricular area [de-identified] : Thyroid approximately 20-30 gm in size, firm and lobular in consistency [de-identified] : DP pulses 2+ bilaterally [de-identified] : Unable to rise from a chair without "rocking" forward a few times, and using his hands for assistance.  Multiple hammer-toe eformities on both feet [FreeTextEntry2] : Multiple hammer-toe deformities [FreeTextEntry6] : Multiple hammer-toe deformities [de-identified] : Tremor is minimal at today's visit.  Vibratory sensation absent over the toes and malleoli

## 2022-05-22 NOTE — HISTORY OF PRESENT ILLNESS
[FreeTextEntry1] : 87-year-old man who is followed for type 2 DM and hypertension.  His diabetes was diagnosed in 2016 when his A1c level (which was being followed because of pre-diabetes) christoph to 6.7%.  HIs glycemic control has been satisfactory on dietary modification alone.  He is also followed for a non-toxic multinodular goiter (with the dominant nodules having been negative on biopsy) and hypercholesterolemia (previously but not currently on statin therapy).  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  He denies any metamorphopsia in that eye, but has floaters for a week after each injection\par --Had surgery for basal cell Lalit on the R side of his nose and the R pre-auricular area.\par --Bowel movements have become more irregular.  Had previously been taking Miralax every day, but is now taking it only intermittently.  Has also been taking the Metamucil less consistently\par --Has had intermittent pain on the left side of his neck\par --Nocturia has increased to 4-5X/night\par \par Is still going to the gym ([employer]) 3 days/week--walks 1/2 mile on the track, also does a 20 min session on a stationary bike\par IVIG infusions are now monthly.  Does not have any noticeable changes in symptoms after the infusions.  \par Has been having more problems with ambulation--thinks that his legs are weaker, ?? not improved after IVIG infusions.  Is fearful of falling when he is out of the house.\par Appetite has been good, and his weight stable\par Fingersticks done by the IVIG nurse have been < 120 mg%\par Current diet:\par --Breakfast is either Cheerios, an English muffin or a slice of rye bread--but he skips breakfast at least 3 days a week\par --Will occasionally have half a sandwich for lunch, otherwise just has some fruit\par --Pasta is the mainstay for supper--protein at the meal is beef at least twice a week (in the lasagne, etc), otherwise poultry \alexandrea --Will sometimes have cake or a little ice cream after supper\alexandrea Sees the neurologist for his Parkinsons every 6 months.  Medications have not been changed.

## 2022-05-22 NOTE — DATA REVIEWED
[FreeTextEntry1] : [facility]  (5/13/22)\par \par ,  A1c 6.1%\par CMP WNL\par Urine microalbumin ratio negative at 10  (normal < 30)\par LDL 81, HDL 50, TG 43\par TSH level normal at 1.17 uU/ml  (0.4-4.5)

## 2022-05-22 NOTE — REVIEW OF SYSTEMS
[Fatigue] : fatigue [Muscle Weakness] : muscle weakness [Dry Skin] : dry skin [Difficulty Walking] : difficulty walking [Tremors] : tremors [Poor Balance] : poor balance [Anxiety] : anxiety [Decreased Appetite] : appetite not decreased [Recent Weight Gain (___ Lbs)] : no recent weight gain [Recent Weight Loss (___ Lbs)] : no recent weight loss [Fever] : no fever [Chills] : no chills [Dry Eyes] : no dryness [Blurred Vision] : no blurred vision [Eyes Itch] : no itch [Hearing Loss] : no hearing loss  [Chest Pain] : no chest pain [Leg Claudication] : no leg claudication [Palpitations] : no palpitations [Lower Ext Edema] : no lower extremity edema [Shortness Of Breath] : no shortness of breath [Cough] : no cough [Wheezing] : no wheezing [SOB on Exertion] : no shortness of breath on exertion [Nausea] : no nausea [Heartburn] : no heartburn [Polyuria] : no polyuria [Dysuria] : no dysuria [Joint Pain] : no joint pain [Myalgia] : no myalgia  [Joint Stiffness] : no joint stiffness [Ulcer] : no ulcer [Headaches] : no headaches [Pain/Numbness of Digits] : no pain/numbness of digits [Depression] : no depression [Stress] : no stress [Polydipsia] : no polydipsia [Cold Intolerance] : no cold intolerance [Heat Intolerance] : no heat intolerance [Easy Bleeding] : no ~M tendency for easy bleeding [Easy Bruising] : no tendency for easy bruising [FreeTextEntry3] : See comments in the HPI re: recent ophth exams/interventions [FreeTextEntry4] : Denies any dysphagia for solid foods or coughing after drinking liquids.  Has been able to swallow pills somewhat better by cutting them in half [FreeTextEntry7] : See comments in the HPI re: bowel pattern [FreeTextEntry8] : Nocturia 4-5X/night [FreeTextEntry9] : Has significant difficulty getting up out of a chair--needs to "rock" back and forth [de-identified] : Had Moh's surgery on his nose and R preauricular area [de-identified] : Tremor has been minimal at this point

## 2022-06-16 NOTE — ED PROVIDER NOTE - WR ORDER NAME 2
Hgb drop to 8.8 is larger than expected.    Will recheck later today and obtain CT   Xray Hand 3 Views, Left

## 2022-10-19 PROBLEM — Z87.898 HISTORY OF EXERTIONAL CHEST PAIN: Status: RESOLVED | Noted: 2021-05-04 | Resolved: 2022-01-01

## 2022-10-19 PROBLEM — K59.03 DRUG-INDUCED CONSTIPATION: Status: ACTIVE | Noted: 2022-05-18

## 2022-10-22 NOTE — ED PROVIDER NOTE - CARE PROVIDER_API CALL
English Jarod Salazar)  Orthopaedic Surgery; Sports Medicine  [address] [place] [address]  Phone: [phone]  Fax: [phone])-  Follow Up Time:

## 2022-10-23 NOTE — HISTORY OF PRESENT ILLNESS
[FreeTextEntry1] : 87-year-old man who is followed for type 2 DM and hypertension.  His diabetes was diagnosed in 2016 when his A1c level (which was being followed because of pre-diabetes) christoph to 6.7%.  HIs glycemic control has been satisfactory on dietary modification alone.  He is also followed for a non-toxic multinodular goiter (with the dominant nodules having been negative on biopsy) and hypercholesterolemia (previously but not currently on statin therapy).  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 Wayne regarding the last of these problems.  CXR showed increased interstitial markings in right perihilar area and LLL.  He will be having a chest CT scan this afternoon.  \alexandrea Is still receiving IVIG every month, but the dose was recently increased.  Fingersticks done by the nurse who administers the dose are usually in the  range at the beginning of the infusioni(which is before breakfast) and 100-120 in the early afternoon.\par His BPs checked by the nurse have been somewhat higher, but the pt does not remember exact values\alexandrea Denies any sensory symptoms in his feet, but thinks that his legs are weaker.  He is not sure whether the weakness is worse after the IVIG effect begins to wear off\par His appetite is only mildly decreased, and his weight is unchanged.  He describes more difficulty swallowing pills, but no dysphagia or choking with solid food\par Current diet:\par --Breakfast is an English muffin\par --Lunch is either fruit or half a sandwich \par --Protein at supper is chicken or fish.  Starch is usually pasta\par --Has been drinking more juice, and also occasional (non-sugar-free) soda\alexandrea Was started on losartan by Dr. Wayne, but the drug was then stopped by Dr. Wayne's PA at a recent visit\par \par Interim history since his last visit:\par --Has continued receiving near-monthly Eylea injections for the macular edema in his R eye.  He denies any metamorphopsia in that eye, but has floaters for a week after each injection\par --Had surgery for basal cell Lalit on the R side of his nose and the R pre-auricular area.\par --Bowel movements have become more irregular.  Had previously been taking Miralax every day, but is now taking it only intermittently.  Has also been taking the Metamucil less consistently\par --Has had intermittent pain on the left side of his neck\par --Nocturia has increased to 4-5X/night\par \par Is still going to the gym ([employer]) 3 days/week--walks 1/2 mile on the track, also does a 20 min session on a stationary bike\par IVIG infusions are now monthly.  Does not have any noticeable changes in symptoms after the infusions.  \par Has been having more problems with ambulation--thinks that his legs are weaker, ?? not improved after IVIG infusions.  Is fearful of falling when he is out of the house.\par Appetite has been good, and his weight stable\par Fingersticks done by the IVIG nurse have been < 120 mg%\par Current diet:\par --Breakfast is either Cheerios, an English muffin or a slice of rye bread--but he skips breakfast at least 3 days a week\par --Will occasionally have half a sandwich for lunch, otherwise just has some fruit\par --Pasta is the mainstay for supper--protein at the meal is beef at least twice a week (in the lasagne, etc), otherwise poultry \par --Will sometimes have cake or a little ice cream after supper\par Sees the neurologist for his Parkinsons every 6 months.  Medications have not been changed.

## 2022-10-23 NOTE — REVIEW OF SYSTEMS
[Fatigue] : fatigue [Muscle Weakness] : muscle weakness [Dry Skin] : dry skin [Difficulty Walking] : difficulty walking [Tremors] : tremors [Poor Balance] : poor balance [Anxiety] : anxiety [Decreased Appetite] : appetite not decreased [Recent Weight Gain (___ Lbs)] : no recent weight gain [Recent Weight Loss (___ Lbs)] : no recent weight loss [Fever] : no fever [Chills] : no chills [Dry Eyes] : no dryness [Blurred Vision] : no blurred vision [Eyes Itch] : no itch [Hearing Loss] : no hearing loss  [Chest Pain] : no chest pain [Leg Claudication] : no leg claudication [Palpitations] : no palpitations [Lower Ext Edema] : no lower extremity edema [Shortness Of Breath] : no shortness of breath [Cough] : no cough [Wheezing] : no wheezing [Nausea] : no nausea [Heartburn] : no heartburn [Diarrhea] : no diarrhea [Polyuria] : no polyuria [Dysuria] : no dysuria [Joint Pain] : no joint pain [Myalgia] : no myalgia  [Joint Stiffness] : no joint stiffness [Ulcer] : no ulcer [Headaches] : no headaches [Pain/Numbness of Digits] : no pain/numbness of digits [Depression] : no depression [Stress] : no stress [Polydipsia] : no polydipsia [Cold Intolerance] : no cold intolerance [Heat Intolerance] : no heat intolerance [Easy Bleeding] : no ~M tendency for easy bleeding [Easy Bruising] : no tendency for easy bruising [FreeTextEntry3] : Has not been back to the ophthalmologist since his last visit here. Complains of mildly blurred vision in his R eye [FreeTextEntry4] : See comments in the HPI re: difficulty swallowing pills [FreeTextEntry6] : Has been noting more CERVANTES, even on level ground [FreeTextEntry7] : Constipation has worsened, but he has not been taking Miralax [FreeTextEntry8] : Nocturia 3-4X/night [FreeTextEntry9] : Has significant difficulty getting up out of a chair--needs to "rock" back and forth [de-identified] : No recent Mohs surgery.  Gets skin checks every 4 months  [de-identified] : Tremor has been minimal at this point

## 2022-10-23 NOTE — PHYSICAL EXAM
[Alert] : alert [Well Nourished] : well nourished [No Acute Distress] : no acute distress [Normal Sclera/Conjunctiva] : normal sclera/conjunctiva [EOMI] : extra ocular movement intact [PERRL] : pupils equal, round and reactive to light [No Proptosis] : no proptosis [No Lid Lag] : no lid lag [Normal Outer Ear/Nose] : the ears and nose were normal in appearance [Normal Hearing] : hearing was normal [No Neck Mass] : no neck mass was observed [No LAD] : no lymphadenopathy [Clear to Auscultation] : lungs were clear to auscultation bilaterally [No Murmurs] : no murmurs [Normal Rate] : heart rate was normal [Regular Rhythm] : with a regular rhythm [Carotids Normal] : carotid pulses were normal with no bruits [No Edema] : no peripheral edema [Not Tender] : non-tender [Soft] : abdomen soft [No HSM] : no hepato-splenomegaly [Normal Supraclavicular Nodes] : no supraclavicular lymphadenopathy [Normal Anterior Cervical Nodes] : no anterior cervical lymphadenopathy [No CVA Tenderness] : no ~M costovertebral angle tenderness [No Joint Swelling] : no joint swelling seen [Normal] : normal [2+] : 2+ in the dorsalis pedis [Vibration Dec.] : diminished vibratory sensation at the level of the toes [Position Sense Dec.] : diminished position sense at the level of the toes [#1 Diminished] : number 1 was diminished [#2 Diminished] : number 2 was diminished [#3 Diminished] : number 3 was diminished [#4 Diminished] : number 4 was diminished [#5 Diminished] : number 5 was diminished [#6 Diminished] : number 6 was diminished [#9 Diminished] : number 9 was diminished [Normal Affect] : the affect was normal [Normal Mood] : the mood was normal [Kyphosis] : no kyphosis present [Acanthosis Nigricans] : no acanthosis nigricans [Foot Ulcers] : no foot ulcers [Delayed in the Right Toes] : normal in the toes [Swelling] : not swollen [Tenderness] : not tender [Erythema] : not erythematous [Delayed in the Left Toes] : normal in the toes [#7 Diminished] : number 7 was normal [#8 Diminished] : number 8 was normal [#10 Diminished] : number 10 was normal [de-identified] : Moderately bradykinetic.  Speech is fairly clear [de-identified] : Dense corneal arcus [de-identified] : Thyroid approximately 20-30 gm in size, firm and lobular in consistency [de-identified] : DP pulses 2+ bilaterally [de-identified] : Unable to rise from a chair without "rocking" forward a few times, and using his hands for assistance.  Multiple hammer-toe eformities on both feet [FreeTextEntry2] : Multiple hammer-toe deformities [FreeTextEntry6] : Multiple hammer-toe deformities [de-identified] : Tremor is minimal at today's visit.  Vibratory sensation absent over the toes and malleoli.  Significant proximal weakness in both LEs

## 2022-10-23 NOTE — DATA REVIEWED
[FreeTextEntry1] : [facility]  (10/15/22)\par \par ,  A1c 6.3%\par CMP WNL\par LDL 94, HDL 40, TG 55\par Urine microalbumin ratio moderately positive at 67 (normal < 30)\par TSH 1.55 uU/ml  (0.27-4.2)

## 2022-10-28 NOTE — ED PROVIDER NOTE - CLINICAL SUMMARY MEDICAL DECISION MAKING FREE TEXT BOX
Admit for generalized weakness/failure to thrive in the setting of recent pneumonia (treated with PO levaquin).  Denies cough or chest pain.  CXR shows LLL atelectasis and based on previous CXR/CT findings appears to be improving and without cough/sob/hypoxia.  Low mag likely related to diminished PO intake.  Elevated hsTnI but normal MB index %, likely due to BP elevation.  No ekg changes appreciated.  Magnesium replaced.  Home BP medication ordered.  Wife now at the bedside and amenable to hospital admission.  Reviewed with PCP, Dr. Wayne - [phone].  Accepted by Dr. Centeno to [facility] at [facility]. Admit for generalized weakness/failure to thrive in the setting of recent pneumonia (treated with PO levaquin).  Denies cough or chest pain.  CXR shows LLL atelectasis and based on previous CXR/CT findings appears to be improving and without cough/sob/hypoxia.  Normal white count and lactate.  Low mag likely related to diminished PO intake as well as ketonuria.  Elevated hsTnI but normal MB index %, likely due to BP elevation.  No ekg changes appreciated.  Magnesium replaced.  Home BP medication ordered.  Wife now at the bedside and amenable to hospital admission.  Reviewed with PCP, Dr. Wayne - [phone].  Accepted by Dr. Centeno to [facility] at [facility].

## 2022-10-28 NOTE — H&P ADULT - ATTENDING COMMENTS
#AMS: hx of Parkinson's, p/w worsening mental status, recent falls, in setting of ?recent pna s/p 8days on levaquin. Currently no s/s of infxn, CXR/UA wnl. No FND on exam, +shuffling gait, CTH w/ chronic microvascular changes. Symptoms likely 2/2 parkinsons vs delirium in setting of recent pna. F/up tsh, b12/folate, rpr. PT/SW consult.      #Fall: likely mechanical. plan as above. EKG nsr, non ischemic. Mild trops likely reactive and in setting of ckd; no CP/SOB. Trend cardiac enzymes, f/up orthostatics, PT/SW. Fall precautions

## 2022-10-28 NOTE — H&P ADULT - PROBLEM SELECTOR PLAN 1
Pt presenting w/ increased confusion for past 8 days since undergoing outpatient treatment for PNA w/ levaquin, since w/ acute worsening for past 3 days following mechanical fall.  - will obtain non-con CT head to assess for intracranial bleed  - will hold levaquin in setting of low clinical suspicion for bacterial PNA  - will resume home Parkinson's meds  - will consider Neuro consult in AM if low suspicion for septic or metabolic source of encephalopathy

## 2022-10-28 NOTE — ED PROVIDER NOTE - NEUROLOGICAL, MLM
AAO x 3.  Parkinsonian tremor noted RUE>LUE.  Scant truncal weakness but able to sit with assistance.  Motor 5/5 proximal and distal upper and lower extremities.  Speech is clear and comprehensible.  Gait exam deffered.

## 2022-10-28 NOTE — H&P ADULT - PROBLEM SELECTOR PLAN 2
Pt w/ recent unwitnessed mechanical fall at home. Otherwise denies head injury, however wife unable to confirm and notes pt seen on the ground with head positioned against the wall. No CTH performed in ED.  - will obtain non-con CT head to assess for intracranial bleed  - fall risk precautions  - PT/OT eval in AM

## 2022-10-28 NOTE — H&P ADULT - NSHPPHYSICALEXAM_GEN_ALL_CORE
T(C): 37.1 (10-28-22 @ 16:50), Max: 37.1 (10-28-22 @ 16:50)  HR: 93 (10-28-22 @ 16:50) (90 - 96)  BP: 173/84 (10-28-22 @ 16:50) (143/81 - 180/90)  RR: 18 (10-28-22 @ 16:50) (18 - 18)  SpO2: 95% (10-28-22 @ 16:50) (94% - 96%)    General: NAD, laying in bed, speaking in full sentences  HEENT: head NC/AT, no conjunctival injection, EOMI, MMM  Neck: supple, no JVD  Cardio: RRR, +S1/S2, no M/R/G  Resp: lungs CTAB, no cough/wheezes/rales/rhonchi  Abdo: soft, NT, ND, +bowel sounds x4, no organomegaly or palpable mass    Extremities: WWP, no edema/cyanosis/clubbing   Vasc: 2+ radial and DP pulses b/l  Neuro: A&Ox3  Psych: speech non-pressured, thoughts goal-oriented  Skin: dry, intact, no visible jaundice   MSK: no joint swelling

## 2022-10-28 NOTE — ED PROVIDER NOTE - OBJECTIVE STATEMENT
BIBEMS after wife spoke with his PMD Dr. Wayne.  Weakness, poor appetite x 8 days.  As per wife he has not been eating food but has been drinking liquids.  Fall on his R arm last week without pain or acute injury.  About two weeks ago had CXR followed by CT scan of the chest confirmed pneumonia and started on Levaquin.  FIndings in the R infrahilar and RML and LLL infiltrates.  Had productive cough then with some blood tinged phlegm.  No SOB or chest pain at that time.  Only complaint now is weakness.  Hx of CDIP, HTN, Parkinsons.  Walks with a cane.  Has no assistance at home.  PMD is Dr. RAFIA Wayne.    Takes Gamunex for CDIP  Sinimet  Losartan  Lexapro

## 2022-10-28 NOTE — H&P ADULT - ASSESSMENT
86yo man w/ HTN, Parkinsons (follows up at [facility]), recently treated outpatient for PNA x 10days (started 10/21), admitted for increased confusion and poor appeitite for past 8 days.

## 2022-10-28 NOTE — ED ADULT NURSE NOTE - NSIMPLEMENTINTERV_GEN_ALL_ED
Implemented All Fall Risk Interventions:  [place] to call system. Call bell, personal items and telephone within reach. Instruct patient to call for assistance. Room bathroom lighting operational. Non-slip footwear when patient is off stretcher. Physically safe environment: no spills, clutter or unnecessary equipment. Stretcher in lowest position, wheels locked, appropriate side rails in place. Provide visual cue, wrist band, yellow gown, etc. Monitor gait and stability. Monitor for mental status changes and reorient to person, place, and time. Review medications for side effects contributing to fall risk. Reinforce activity limits and safety measures with patient and family.

## 2022-10-28 NOTE — ED PROVIDER NOTE - CONSTITUTIONAL, MLM
normal... Appears his stated age., awake, alert, oriented to person, place, time/situation and in no apparent distress.

## 2022-10-28 NOTE — ED ADULT NURSE NOTE - OBJECTIVE STATEMENT
pt. brought in today for generalized weakness and poor PO intake x1week. Pt. reports hes been on antibiotics for about a week. Pt. has no physical complaints at this time, placed on bed alarm and monitoring closely.

## 2022-10-28 NOTE — ED PROVIDER NOTE - NSICDXPASTMEDICALHX_GEN_ALL_CORE_FT
PAST MEDICAL HISTORY:  HTN (hypertension)     Neuropathy of lower extremity     Parkinson disease

## 2022-10-28 NOTE — H&P ADULT - HISTORY OF PRESENT ILLNESS
Mr. Flannery is a 86yo man w/ HTN, Parkinsons, recently treated outpatient for PNA x 10days (on day 8 of levaquin), admitted for increased confusion and failure to thrive for past 8 days. Mr. Flannery is a 86yo man w/ HTN, Parkinsons (follows up at [facility]), recently treated outpatient for PNA x 10days (started 10/21), admitted for increased confusion and poor appetite for past 8 days. Per collateral history from patient's wife, 8 days ago patient was diagnosed w/ PNA by his outpatient PCP and started on PO levaquin x 10days. Since that time, patient's wife reports noticing that patient has become progressively more confused appearing, presenting as increased forgetfulness and disorientation to location. She otherwise states his baseline to be AOx3 without any significant cognitive impairment prior to taking levaquin. 3 days ago, she reports patient had experienced an unwitnessed fall at home in which she heard patient fall and found him laying against the side of the wall and appeared to have injured his L wrist w/o clear signs of head injury. Since the fall, patient has become more acutely confused and having reduced PO intake, and was brought to [facility] for further evaluation. Patient himself is able to recall details of the recent fall, and states that he lost his balance, and otherwise denies hitting his head or LOC. He also denies recent or active fever, chills, headaches, nausea, vomiting, chest pain, palpitations, sob, dyspnea on exertion, orthopnea, abdominal pain, genitourinary sx, extremity pain or swelling.

## 2022-10-28 NOTE — PATIENT PROFILE ADULT - FALL HARM RISK - HARM RISK INTERVENTIONS
Assistance with ambulation/Assistance OOB with selected safe patient handling equipment/Communicate Risk of Fall with Harm to all staff/Discuss with provider need for PT consult/Monitor gait and stability/Provide patient with walking aids - walker, cane, crutches/Reinforce activity limits and safety measures with patient and family/Tailored Fall Risk Interventions/Use of alarms - bed, chair and/or voice tab/Visual Cue: Yellow wristband and red socks/Bed in lowest position, wheels locked, appropriate side rails in place/Call bell, personal items and telephone in reach/Instruct patient to call for assistance before getting out of bed or chair/Non-slip footwear when patient is out of bed/[place] to call system/Physically safe environment - no spills, clutter or unnecessary equipment/Purposeful Proactive Rounding/Room/bathroom lighting operational, light cord in reach
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
## 2022-10-28 NOTE — H&P ADULT - NSHPSOCIALHISTORY_GEN_ALL_CORE
- lives w/ wife at home  - denies hx smoking or illicit substance  - drinks 1-2 beers/wk only during social occasions  - functionally independent at baseline

## 2022-10-28 NOTE — H&P ADULT - NSHPLABSRESULTS_GEN_ALL_CORE
LABS:                        11.9   6.59  )-----------( 166      ( 28 Oct 2022 12:11 )             36.1     10-28    140  |  105  |  20  ----------------------------<  147<H>  3.5   |  29  |  1.24    Ca    8.6      28 Oct 2022 12:11  Mg     1.4     10-28    TPro  7.9  /  Alb  3.4  /  TBili  0.6  /  DBili  x   /  AST  31  /  ALT  9<L>  /  AlkPhos  67  10-28        CAPILLARY BLOOD GLUCOSE      POCT Blood Glucose.: 136 mg/dL (28 Oct 2022 12:08)      CARDIAC MARKERS ( 28 Oct 2022 12:54 )  x     / x     / 403 U/L / x     / 3.8 ng/mL          Urinalysis Basic - ( 28 Oct 2022 12:11 )    Color: Yellow / Appearance: Clear / SG: >=1.030 / pH: x  Gluc: x / Ketone: 15 mg/dL  / Bili: Small / Urobili: 1.0 E.U./dL   Blood: x / Protein: 30 mg/dL / Nitrite: NEGATIVE   Leuk Esterase: NEGATIVE / RBC: < 5 /HPF / WBC < 5 /HPF   Sq Epi: x / Non Sq Epi: x / Bacteria: x          LIVER FUNCTIONS - ( 28 Oct 2022 12:11 )  Alb: 3.4 g/dL / Pro: 7.9 g/dL / ALK PHOS: 67 U/L / ALT: 9 U/L / AST: 31 U/L / GGT: x                     I & O Summary:      Microbiology:        RADIOLOGY, EKG AND ADDITIONAL TESTS: Reviewed.

## 2022-10-28 NOTE — ED ADULT TRIAGE NOTE - CHIEF COMPLAINT QUOTE
BIBA from home c/o generalized weakness with decreased PO intake x 1 week. as per EMS, diagnosed with pneumonia 8 days ago, took his last dose of abx today. denies pain. h/o HTN and parkinson's.  in the field with 20g L hand.

## 2022-10-28 NOTE — ED PROVIDER NOTE - CARE PLAN
1 Principal Discharge DX:	General weakness  Secondary Diagnosis:	Hypomagnesemia  Secondary Diagnosis:	Elevated troponin I level

## 2022-10-28 NOTE — H&P ADULT - PROBLEM SELECTOR PLAN 5
F: none  E: replete PRN  N: regular diet  DVT ppx: lovenox 40mg QHS  GI ppx: not required  Code status: full code F: none  E: replete PRN  N: regular diet  DVT ppx: hold prior to rulling bleed on CTH  GI ppx: not required  Code status: full code

## 2022-10-29 NOTE — PROGRESS NOTE ADULT - PROBLEM SELECTOR PLAN 2
Pt w/ recent unwitnessed mechanical fall at home. Otherwise denies head injury, however wife unable to confirm and notes pt seen on the ground with head positioned against the wall. No CTH performed in ED.  - CT head no acute intracranial pathology  - fall risk precautions  - PT recommended [facility], patient and wife would like [facility] in [place]

## 2022-10-29 NOTE — PHYSICAL THERAPY INITIAL EVALUATION ADULT - GAIT DEVIATIONS NOTED, PT EVAL
Pt unsteady w/ dec weight shifting abilities. Fear of falling noted. Retropulsive, requiring assist to maintain upright standing posture. Pt w/ tendency to reach for walls for improved standing balance. No falls or knee buckling noted./decreased ben/decreased velocity of limb motion/decreased step length/decreased weight-shifting ability

## 2022-10-29 NOTE — PHYSICAL THERAPY INITIAL EVALUATION ADULT - GROSSLY INTACT, SENSORY
Grossly intact to light touch sensation throughout BLE and BUE. Denies numbness, tingling, burning or radiating symptoms along BLE and BUE.

## 2022-10-29 NOTE — PHYSICAL THERAPY INITIAL EVALUATION ADULT - COORDINATION ASSESSED, REHAB EVAL
Finger to nose - dysmetria noted along BUE. Heel to shin - slight dysmetria noted w/ RLE heel to L shin.

## 2022-10-29 NOTE — PROGRESS NOTE ADULT - PROBLEM SELECTOR PLAN 1
Pt presenting w/ increased confusion for past 8 days since undergoing outpatient treatment for PNA w/ levaquin, since w/ acute worsening for past 3 days following mechanical fall.  - CT head no acute intracranial pathology  - D/C'ed levaquin in setting of low clinical suspicion for bacterial PNA  - restarted patient on carbidopa/levodopa 25/100 2 tabs TID with meals and 25/100 ER 2 tabs at bedtime

## 2022-10-29 NOTE — DIETITIAN INITIAL EVALUATION ADULT - OTHER INFO
88yo man w/ HTN, Parkinsons (follows up at [facility]), recently treated outpatient for PNA x 10days (started 10/21), admitted for increased confusion and poor appetite for past 8 days.     Pt seen in 7UR. On regular diet. NKFA. Repors decreased appetite In 8 days. CBW 190lbs. 86yo man w/ HTN, Parkinsons (follows up at [facility]), recently treated outpatient for PNA x 10days (started 10/21), admitted for increased confusion and poor appetite for past 8 days.     Pt seen in 7UR. On regular diet. NKFA. No family member present at bedside. Pt is AOx1. PCA on bedside, help provide information. Pt has not eaten breakfast. Suspected PO intake<50% in the past week. EMR noted pt with decreased appetite in 8 days. CBW 190lbs. No weight history on EMR. Observed moderate muscle wasting on thigh and calf. As per ASPEN guidelines, pt meets criteria for moderate malnutrition 88yo man w/ HTN, Parkinsons (follows up at [facility]), recently treated outpatient for PNA x 10days (started 10/21), admitted for increased confusion and poor appetite for past 8 days.     Pt seen in 7UR. On regular diet. NKFA. No family member present at bedside. Pt is AOx1. PCA on bedside, help provide information. Pt has not eaten breakfast. Suspected PO intake<50% in the past week. EMR noted pt with decreased appetite in 8 days. CBW 190lbs. No weight history on EMR. Observed moderate muscle wasting on thigh and calf. As per ASPEN guidelines, pt meets criteria for moderate malnutrition. Amendable to ONS. Noted (H). Lytes wnl. Skin intact. Baron: 20. No edema. Education defer at this due to patient confused. see additional nutrition recs below.

## 2022-10-29 NOTE — PHYSICAL THERAPY INITIAL EVALUATION ADULT - BED MOBILITY LIMITATIONS, REHAB EVAL
Pt w/ increased rigidity throughout BUE - impaired trunk control to perform sitting at EOB. Retropulsive sitting at EOB. No acute distress noted./decreased ability to use arms for pushing/pulling/decreased ability to use legs for bridging/pushing/impaired ability to control trunk for mobility

## 2022-10-29 NOTE — DIETITIAN INITIAL EVALUATION ADULT - OTHER CALCULATIONS
ActualBW used for calculations as pt between % of IBW (93%) Adjusted for needs in older adult.  ActualBW used for calculations as pt between % of IBW (93%) Adjusted for needs in older adult; slight increase calories for moderate malnutrition.

## 2022-10-29 NOTE — PROVIDER CONTACT NOTE (OTHER) - SITUATION
Provider Yuriy made aware of patient /91 ; 
Provider made aware that patient frequently tries to leave bed unattended without assistance.
troponin level  0.04 MD aware

## 2022-10-29 NOTE — DIETITIAN INITIAL EVALUATION ADULT - EDUCATION DIETARY MODIFICATIONS
patient with altered mental status/unable to verbalize/demonstrate/(0) unable to meet; needs instruction

## 2022-10-29 NOTE — PHYSICAL THERAPY INITIAL EVALUATION ADULT - ADDITIONAL COMMENTS
Pt currently resides in elevator apt w/ wife, 5 CLAUD[address]. Primarily amb w/ SC. Experienced 1 fall within past 6 months, unable to self-recover. Denies HHA, wife assists w/ needs however w/ increasing difficulty recently. Currently taking medication for Parkinson's Disease - states having bilateral resting UE tremors.

## 2022-10-29 NOTE — PHYSICAL THERAPY INITIAL EVALUATION ADULT - MANUAL MUSCLE TESTING RESULTS, REHAB EVAL
At least 3/5 BL UE & LE based on observed ability to perform antigravity mobility. Grossly fair bilateral  strength noted.

## 2022-10-29 NOTE — DIETITIAN INITIAL EVALUATION ADULT - ADD RECOMMEND
1. Continue regular diet. Consider DASH diet if pt BP continue to be elevated. 2. Recommend adding Ensure Enlive BID (700 kcal, 40g protein, 360 mL free H2O) to meet EER, promote PO intake 3. Nutrition education (high protein snacks, small frequent meals) on followup or when pt mentally more alert. 4. Monitor BP, lytes, BG, GI distress. 1. Continue regular diet. Consider DASH diet if pt BP continue to be elevated. Consider appetite stimulant if medically feasible 2. Recommend adding Ensure Enlive BID (700 kcal, 40g protein, 360 mL free H2O) to meet EER, promote PO intake. Team paged. Pending verification. 3. Nutrition education (high protein snacks, small frequent meals) on followup or when pt mentally more alert. 4. Monitor BP, lytes, BG, GI distress. 1. Continue regular diet. Consider DASH diet if pt BP continue to be elevated. Consider appetite stimulant if medically feasible 2. Recommend adding Ensure Enlive Max BID (150kcal, 30g PRO) to meet EER, promote PO intake. Team paged. Pending verification. 3. Nutrition education (high protein snacks, small frequent meals) on followup or when pt mentally more alert. 4. Monitor BP, lytes, BG, GI distress.

## 2022-10-29 NOTE — PROGRESS NOTE ADULT - PROBLEM SELECTOR PLAN 4
Patient with pmhx of Parkinson's Disease  - c/w home carbidopa/levodopa 25/100 2 tabs TID with meals and 25/100 ER* 2 tabs at bedtime

## 2022-10-29 NOTE — DIETITIAN NUTRITION RISK NOTIFICATION - TREATMENT: THE FOLLOWING DIET HAS BEEN RECOMMENDED
1. Continue regular diet. Consider DASH diet if pt BP continue to be elevated.   >>Consider appetite stimulant if medically feasible   2. Recommend adding Ensure Enlive BID (700 kcal, 40g protein, 360 mL free H2O) to meet EER, promote PO intake.   >>Team paged. Pending verification.   3. Nutrition education (high protein snacks, small frequent meals) on followup or when pt mentally more alert.   4. Monitor BP, lytes, BG, GI distress.     1. Continue regular diet. Consider DASH diet if pt BP continue to be elevated.   >>Consider appetite stimulant if medically feasible   2. Recommend adding Ensure Max BID (150cal, 30g pro/serving) to meet EER, promote PO intake.   >>Team paged. Pending verification.   3. Nutrition education (high protein snacks, small frequent meals) on followup or when pt mentally more alert.   4. Monitor BP, lytes, BG, GI distress.

## 2022-10-29 NOTE — PROGRESS NOTE ADULT - SUBJECTIVE AND OBJECTIVE BOX
O/N Events: kendall    Subjective/ROS: Patient seen and examined at bedside.     Denies Fever/Chills, HA, CP, SOB, n/v, changes in bowel/urinary habits.  12pt ROS otherwise negative.    VITALS  Vital Signs Last 24 Hrs  T(C): 36.8 (29 Oct 2022 21:19), Max: 37 (29 Oct 2022 06:00)  T(F): 98.3 (29 Oct 2022 21:19), Max: 98.6 (29 Oct 2022 06:00)  HR: 98 (29 Oct 2022 21:19) (88 - 104)  BP: 177/84 (29 Oct 2022 21:19) (156/78 - 177/84)  BP(mean): --  RR: 17 (29 Oct 2022 21:19) (17 - 18)  SpO2: 95% (29 Oct 2022 21:19) (95% - 97%)    Parameters below as of 29 Oct 2022 21:19  Patient On (Oxygen Delivery Method): room air    CAPILLARY BLOOD GLUCOSE    PHYSICAL EXAM  General: NAD, speaking in full sentences with some confabulations.   Head: NC/AT; MMM; PERRL; EOMI;  Neck: Supple; no JVD  Respiratory: CTAB; no wheezes/rales/rhonchi  Cardiovascular: Regular rhythm/rate; S1/S2+  Gastrointestinal: Soft; NTND; bowel sounds normal and present  Extremities: WWP; no edema/cyanosis  Neurological: A&Ox3, appears better than previous (as per wife at bedside)    MEDICATIONS  (STANDING):  carbidopa/levodopa 25/100 Disintegrating Tablet 2 Tablet(s) Oral three times a day  carbidopa/levodopa CR 25/100 2 Tablet(s) Oral at bedtime  escitalopram 10 milliGRAM(s) Oral every 24 hours  losartan 25 milliGRAM(s) Oral every 24 hours  melatonin 1 milliGRAM(s) Oral at bedtime    MEDICATIONS  (PRN):      No Known Allergies      LABS                        11.5   6.53  )-----------( 166      ( 29 Oct 2022 08:36 )             36.4     10-29    143  |  104  |  14  ----------------------------<  131<H>  3.6   |  31  |  0.86    Ca    8.9      29 Oct 2022 08:36  Phos  2.7     10-29  Mg     1.8     10-29    TPro  7.1  /  Alb  3.9  /  TBili  0.6  /  DBili  x   /  AST  20  /  ALT  <5<L>  /  AlkPhos  62  10-29      Urinalysis Basic - ( 28 Oct 2022 12:11 )    Color: Yellow / Appearance: Clear / SG: >=1.030 / pH: x  Gluc: x / Ketone: 15 mg/dL  / Bili: Small / Urobili: 1.0 E.U./dL   Blood: x / Protein: 30 mg/dL / Nitrite: NEGATIVE   Leuk Esterase: NEGATIVE / RBC: < 5 /HPF / WBC < 5 /HPF   Sq Epi: x / Non Sq Epi: x / Bacteria: x      CARDIAC MARKERS ( 29 Oct 2022 08:36 )  x     / 0.03 ng/mL / 378 U/L / x     / x      CARDIAC MARKERS ( 28 Oct 2022 22:50 )  x     / 0.03 ng/mL / 394 U/L / x     / 5.4 ng/mL  CARDIAC MARKERS ( 28 Oct 2022 12:54 )  x     / x     / 403 U/L / x     / 3.8 ng/mL      IMAGING/EKG/ETC

## 2022-10-29 NOTE — DIETITIAN INITIAL EVALUATION ADULT - PERTINENT LABORATORY DATA
10-29    143  |  104  |  14  ----------------------------<  131<H>  3.6   |  31  |  0.86    Ca    8.9      29 Oct 2022 08:36  Phos  2.7     10-29  Mg     1.8     10-29    TPro  7.1  /  Alb  3.9  /  TBili  0.6  /  DBili  x   /  AST  20  /  ALT  <5<L>  /  AlkPhos  62  10-29

## 2022-10-29 NOTE — PROGRESS NOTE ADULT - PROBLEM SELECTOR PLAN 5
F: none  E: replete PRN  N: regular diet  DVT ppx: hold prior to rulling bleed on CTH  GI ppx: not required  Code status: full code

## 2022-10-29 NOTE — DIETITIAN INITIAL EVALUATION ADULT - PERTINENT MEDS FT
MEDICATIONS  (STANDING):  carbidopa/levodopa 25/100 Disintegrating Tablet 2 Tablet(s) Oral three times a day  carbidopa/levodopa CR 25/100 2 Tablet(s) Oral at bedtime  escitalopram 10 milliGRAM(s) Oral every 24 hours  losartan 25 milliGRAM(s) Oral every 24 hours  melatonin 1 milliGRAM(s) Oral at bedtime    MEDICATIONS  (PRN):

## 2022-10-29 NOTE — PHYSICAL THERAPY INITIAL EVALUATION ADULT - PLANNED THERAPY INTERVENTIONS, PT EVAL
balance training/bed mobility training/postural re-education/ROM/strengthening/stretching/transfer training

## 2022-10-29 NOTE — PHYSICAL THERAPY INITIAL EVALUATION ADULT - PERTINENT HX OF CURRENT PROBLEM, REHAB EVAL
Mr. Flannery is a 88yo man w/ HTN, Parkinsons (follows up at [facility]), recently treated outpatient for PNA x 10days (started 10/21), admitted for increased confusion and poor appetite for past 8 days. Per collateral history from patient's wife, 8 days ago patient was diagnosed w/ PNA by his outpatient PCP and started on PO levaquin x 10days. Since that time, patient's wife reports noticing that patient has become progressively more confused appearing, presenting as increased forgetfulness and disorientation to location. She otherwise states his baseline to be AOx3 without any significant cognitive impairment prior to taking levaquin. 3 days ago, she reports patient had experienced an unwitnessed fall at home in which she heard patient fall and found him laying against the side of the wall and appeared to have injured his L wrist w/o clear signs of head injury. Since the fall, patient has become more acutely confused and having reduced PO intake, and was brought to [facility] for further evaluation. Patient himself is able to recall details of the recent fall, and states that he lost his balance, and otherwise denies hitting his head or LOC. He also denies recent or active fever, chills, headaches, nausea, vomiting, chest pain, palpitations, sob, dyspnea on exertion, orthopnea, abdominal pain, genitourinary sx, extremity pain or swelling.

## 2022-10-29 NOTE — PROGRESS NOTE ADULT - ASSESSMENT
88yo man w/ HTN, Parkinsons (follows up at [facility]), recently treated outpatient for PNA x 10days (started 10/21), admitted for increased confusion and poor appeitite for past 8 days.

## 2022-10-29 NOTE — PHYSICAL THERAPY INITIAL EVALUATION ADULT - TRANSFER SAFETY CONCERNS NOTED: SIT/STAND, REHAB EVAL
Pt w/ dec sequencing abilities despite max VC and TC given. Unsteady standing balance, retropulsive w/ tendency to reach for walls for improving standing balance./decreased balance during turns/losing balance/decreased sequencing ability/decreased weight-shifting ability

## 2022-10-30 NOTE — OCCUPATIONAL THERAPY INITIAL EVALUATION ADULT - MD ORDER
88yo man w/ HTN, Parkinsons (follows up at [facility]), recently treated outpatient for PNA x 10days (started 10/21), admitted s/p fall for increased confusion and poor appeitite for past 8 days.

## 2022-10-30 NOTE — PROGRESS NOTE ADULT - ASSESSMENT
88yo man w/ HTN, Parkinsons (follows up at [facility]), recently treated outpatient for PNA x 10days (started 10/21), admitted for increased confusion and poor appeitite for past 8 days.

## 2022-10-30 NOTE — PROGRESS NOTE ADULT - SUBJECTIVE AND OBJECTIVE BOX
O/N Events: PARK  Subjective/ROS: No complaints. Denies HA, CP, SOB, n/v, changes in bowel/urinary habits.  12pt ROS otherwise negative.    VITALS  Vital Signs Last 24 Hrs  T(C): 36.7 (30 Oct 2022 12:21), Max: 36.8 (29 Oct 2022 21:19)  T(F): 98 (30 Oct 2022 12:21), Max: 98.3 (29 Oct 2022 21:19)  HR: 88 (30 Oct 2022 12:21) (88 - 98)  BP: 161/89 (30 Oct 2022 12:21) (161/85 - 177/84)  BP(mean): --  RR: 17 (30 Oct 2022 12:21) (17 - 18)  SpO2: 98% (30 Oct 2022 12:21) (95% - 98%)    Parameters below as of 30 Oct 2022 12:21  Patient On (Oxygen Delivery Method): room air    I&O's Summary    CAPILLARY BLOOD GLUCOSE    PHYSICAL EXAM  General: NAD, speaking in full sentences with some confabulations.   Head: NC/AT; MMM; PERRL; EOMI;  Neck: Supple; no JVD  Respiratory: CTAB; no wheezes/rales/rhonchi  Cardiovascular: Regular rhythm/rate; S1/S2+  Gastrointestinal: Soft; NTND; bowel sounds normal and present  Extremities: WWP; no edema/cyanosis  Neurological: A&Ox3, appears better than previous (as per wife at bedside)    MEDICATIONS  (STANDING):  carbidopa/levodopa 25/100 Disintegrating Tablet 2 Tablet(s) Oral three times a day  carbidopa/levodopa CR 25/100 2 Tablet(s) Oral at bedtime  escitalopram 10 milliGRAM(s) Oral every 24 hours  losartan 25 milliGRAM(s) Oral every 24 hours  melatonin 1 milliGRAM(s) Oral at bedtime    MEDICATIONS  (PRN):      No Known Allergies      LABS                        11.7   7.36  )-----------( 178      ( 30 Oct 2022 05:30 )             36.4     10-30    143  |  105  |  20  ----------------------------<  187<H>  3.4<L>   |  26  |  0.96    Ca    9.0      30 Oct 2022 05:30  Phos  2.8     10-30  Mg     1.8     10-30    TPro  7.1  /  Alb  3.9  /  TBili  0.6  /  DBili  x   /  AST  20  /  ALT  <5<L>  /  AlkPhos  62  10-29        CARDIAC MARKERS ( 30 Oct 2022 05:30 )  x     / 0.05 ng/mL / x     / x     / x      CARDIAC MARKERS ( 29 Oct 2022 22:45 )  x     / 0.04 ng/mL / x     / x     / x      CARDIAC MARKERS ( 29 Oct 2022 08:36 )  x     / 0.03 ng/mL / 378 U/L / x     / x      CARDIAC MARKERS ( 28 Oct 2022 22:50 )  x     / 0.03 ng/mL / 394 U/L / x     / 5.4 ng/mL        IMAGING/EKG/ETC: reviewed

## 2022-10-30 NOTE — PROGRESS NOTE ADULT - PROBLEM SELECTOR PLAN 2
- CT head no acute intracranial pathology  - D/C'ed levaquin in setting of low clinical suspicion for bacterial PNA  - restarted patient on carbidopa/levodopa 25/100 2 tabs TID with meals and 25/100 ER 2 tabs at bedtime Levaquin induced, resolved

## 2022-10-30 NOTE — OCCUPATIONAL THERAPY INITIAL EVALUATION ADULT - LEVEL OF INDEPENDENCE: SIT/STAND, REHAB EVAL
deferred as pt very confused with decreased safety awareness, pt also with height of 6ft 3in. Pt requires 2nd person present for safe sit>stand transfer.

## 2022-10-30 NOTE — OCCUPATIONAL THERAPY INITIAL EVALUATION ADULT - ADDITIONAL COMMENTS
Pt is a questionable historian. PLOF confirmed with SW note as pt's wife was not present during OT session. Pt lives with his wife in an elevator access apartment, 5STE. Pt has a bathtub shower with no DME. Pt reports he is mostly independent with ADLs, however, requires assist from wife for donning pants/sneakers. Pt ambulates with a SC.

## 2022-10-30 NOTE — PROGRESS NOTE ADULT - PROBLEM SELECTOR PLAN 6
F: none  E: replete PRN  N: regular diet  DVT ppx: hold prior to rulling bleed on CTH  GI ppx: not required  Code status: full code - c/w home carbidopa/levodopa 25/100 2 tabs TID with meals and 25/100 ER* 2 tabs at bedtime

## 2022-10-30 NOTE — OCCUPATIONAL THERAPY INITIAL EVALUATION ADULT - PHYSICAL ASSIST/NONPHYSICAL ASSIST:DRESS UPPER BODY, OT EVAL
Assessment & Plan:      Problem List Items Addressed This Visit    None  Visit Diagnoses     Right-sided chest wall pain    -  Primary        Medical Decision Making  Patient presents with right-sided rib pains for over 24 hours.  No signs concerning for acute rib fracture.  No respiratory distress to make concerns for pneumothorax versus bacterial pneumonia low.  Suspect likely irritation of the chest wall muscles versus slight irritation of the right diaphragm.  Recommend warm compresses, over-the-counter analgesics, and avoidance of aggravating activities.  Allergies and medication interactions reviewed.  Discussed signs of worsening symptoms and when to follow-up with PCP if no symptom improvement.     Subjective:      History provided by the patient.  He is here with his father.  Salvador Hanson is a 17 year old male here for evaluation of right-sided rib pains.  Onset of symptoms was yesterday.  Pains began gradually around the middle of the day.  Patient notes slight pains when taking a deep breath on the right side.  Also increased pain with coughing or sneezing.  He denies shortness of breath.  No known injury or trauma.  No other cough, fevers, or sore throat.     The following portions of the patient's history were reviewed and updated as appropriate: allergies, current medications, and problem list.     Review of Systems  Pertinent items are noted in HPI.    Allergies  Allergies   Allergen Reactions     Ritalin [Methylphenidate]        Family History   Problem Relation Age of Onset     Diabetes Mother      Diabetes Father      Asthma Father      Hypertension Father      No Known Problems Brother      No Known Problems Sister      No Known Problems Sister        Social History     Tobacco Use     Smoking status: Never     Smokeless tobacco: Never     Tobacco comments:     sister smokes outside   Substance Use Topics     Alcohol use: Never        Objective:      /72   Pulse 85   Temp 97.7  F  (36.5  C) (Oral)   Resp 14   Wt 83 kg (183 lb)   SpO2 98%   General appearance - alert, well appearing, and in no distress and non-toxic  Chest - clear to auscultation, no wheezes, rales or rhonchi, symmetric air entry  Chest wall - no tenderness to palpation throughout the right side chest; no bony deformity  Heart - normal rate, regular rhythm, normal S1, S2, no murmurs, rubs, clicks or gallops  Skin - normal coloration and turgor, no rashes, no suspicious skin lesions noted    The use of Dragon/VoAPPs dictation services was used to construct the content of this note; any grammatical errors are non-intentional. Please contact the author directly if you are in need of any clarification.      verbal cues/nonverbal cues (demo/gestures)/1 person assist

## 2022-10-30 NOTE — PROGRESS NOTE ADULT - PROBLEM SELECTOR PLAN 4
- c/w home losartan 25mg QD - CT head no acute intracranial pathology  - fall risk precautions  - PT recommended [facility]O, patient and wife would like [facility]CIANO in [place]

## 2022-10-30 NOTE — PROGRESS NOTE ADULT - PROBLEM SELECTOR PLAN 5
- c/w home carbidopa/levodopa 25/100 2 tabs TID with meals and 25/100 ER* 2 tabs at bedtime - c/w home losartan 25mg QD

## 2022-10-30 NOTE — PROGRESS NOTE ADULT - PROBLEM SELECTOR PLAN 3
- CT head no acute intracranial pathology  - fall risk precautions  - PT recommended MARCIANO, patient and wife would like MARCIANO in [place] - CT head no acute intracranial pathology  - D/C'ed levaquin in setting of low clinical suspicion for bacterial PNA  - restarted patient on carbidopa/levodopa 25/100 2 tabs TID with meals and 25/100 ER 2 tabs at bedtime - CT head no acute intracranial pathology  - fall risk precautions  - PT recommended [facility]CIANO, patient and wife would like [facility]CIANO in [place]

## 2022-10-30 NOTE — OCCUPATIONAL THERAPY INITIAL EVALUATION ADULT - ORIENTATION, REHAB EVAL
disoriented to place stating it looks like "a nice house", pt stating that it was April 1st, reoriented accordingly. At end of session pt able to remember he was in the hospital, unable to name. Pt was unable to recall date at end of session./person

## 2022-10-30 NOTE — OCCUPATIONAL THERAPY INITIAL EVALUATION ADULT - MODIFIED CLINICAL TEST OF SENSORY INTEGRATION IN BALANCE TEST
Pt able to maintain upright posture seated EOB with supervision, however, when testing BLE strength, pt with slight retropulsion requiring mod A from therapist to maintain upright posture.

## 2022-10-30 NOTE — OCCUPATIONAL THERAPY INITIAL EVALUATION ADULT - PHYSICAL ASSIST/NONPHYSICAL ASSIST:DRESS LOWER BODY, OT EVAL
pt typically requires assist for socks/pants/verbal cues/nonverbal cues (demo/gestures)/1 person assist

## 2022-10-30 NOTE — PROGRESS NOTE ADULT - PROBLEM SELECTOR PLAN 1
Levaquin induced, resolved Patient with uptrending trops from 0.03 to 0.05, now 0.04 in the setting of HTN with BP of 165/90, could be demand ischemia due to elevated BP.   - increased losartan from 25mg to 50mg qdaily   - trops downtrending now to 0.04  - EKG wnl

## 2022-10-30 NOTE — OCCUPATIONAL THERAPY INITIAL EVALUATION ADULT - GENERAL OBSERVATIONS, REHAB EVAL
OT IE Completed. MD Mendoza (7U2) and pt's RN Abe cleared pt for therapy. Pt received semisupine in bed, +bed alarm, +1:1, +heplock, room air, NAD, agreeable to OT. Pt tolerated session fairly. Pt left as found, +bed alarm, RN present.

## 2022-10-30 NOTE — OCCUPATIONAL THERAPY INITIAL EVALUATION ADULT - DIAGNOSIS, OT EVAL
Pt presents with confusion, decreased balance, decreased coordination (BUE tremors 2/2 Parkinson's), decreased postural control, and decreased functional endurance impacting his ability to independently complete ADLs, functional transfers, and functional mobility.

## 2022-10-31 NOTE — DISCHARGE NOTE PROVIDER - NSDCCPCAREPLAN_GEN_ALL_CORE_FT
PRINCIPAL DISCHARGE DIAGNOSIS  Diagnosis: Subacute delirium  Assessment and Plan of Treatment: You came in with confusion, which may have been because of the medication you took for your pneumonia (levaquin). We stopped that medication and your mental status improved. Please follow up with your neurologist to discuss this confusion.      SECONDARY DISCHARGE DIAGNOSES  Diagnosis: CIDP (chronic inflammatory demyelinating polyneuropathy)  Assessment and Plan of Treatment: Because of your CIDP, you get IVIG infusions every 4 weeks. You are due on 11/4/2022. You did not receive your IVIG infusion in the hospital because you had a infectious rash on your back.    Diagnosis: Diabetes  Assessment and Plan of Treatment: While you were in the hospital we measured you A1c, which is a measure for diabetes. Your value was     PRINCIPAL DISCHARGE DIAGNOSIS  Diagnosis: Subacute delirium  Assessment and Plan of Treatment: You came in with confusion, which may have been because of the medication you took for your pneumonia (levaquin). We stopped that medication and your mental status improved. Please follow up with your neurologist to discuss this confusion.      SECONDARY DISCHARGE DIAGNOSES  Diagnosis: CIDP (chronic inflammatory demyelinating polyneuropathy)  Assessment and Plan of Treatment: Because of your CIDP, you get IVIG infusions every 4 weeks. You are due on 11/4/2022. You did not receive your IVIG infusion in the hospital because you had a infectious rash on your back. You should receive this infusion at Rehab. You receive Gamunex-C 90g once a month. You receive this IV at one time over 7.5 hours. Your pre-medications are famotidine 10mg and tyelnol 325mg.    Diagnosis: Diabetes  Assessment and Plan of Treatment: While you were in the hospital we measured you A1c, which is a measure for diabetes. Your value was 6.5, which is diagnostic of diabetes. Please follow up with your primary care provider to discuss treatment of your diabetes.

## 2022-10-31 NOTE — PROGRESS NOTE ADULT - PROBLEM SELECTOR PLAN 4
Patient with pmhx of Parkinson's Disease  - c/w home carbidopa/levodopa 25/100 2 tabs TID with meals and 25/100 ER* 2 tabs at bedtime Patient with pmhx of HTN  - increased losartan from 25mg qdaily to 50mg qdaily, will continue to monitor BP

## 2022-10-31 NOTE — DISCHARGE NOTE PROVIDER - CARE PROVIDER_API CALL
Kelvin Anthony)  Internal Medicine  [address] [place] [address]  Phone: [phone]  Fax: [phone]  Follow Up Time: 1 month

## 2022-10-31 NOTE — DISCHARGE NOTE PROVIDER - DETAILS OF MALNUTRITION DIAGNOSIS/DIAGNOSES
This patient has been assessed with a concern for Malnutrition and was treated during this hospitalization for the following Nutrition diagnosis/diagnoses:     -  10/29/2022: Moderate protein-calorie malnutrition

## 2022-10-31 NOTE — DISCHARGE NOTE PROVIDER - NSFOLLOWUPCLINICS_GEN_ALL_ED_FT
[facility]  Family Medicine  [address]  [place], [place] [address]  Phone: [phone]  Fax:   Follow Up Time: 1 month

## 2022-10-31 NOTE — PROGRESS NOTE ADULT - PROBLEM SELECTOR PLAN 2
Pt w/ recent unwitnessed mechanical fall at home. Otherwise denies head injury, however wife unable to confirm and notes pt seen on the ground with head positioned against the wall. No CTH performed in ED.  - CT head no acute intracranial pathology  - fall risk precautions  - PT recommended [facility]O, patient and wife would like MARCIANO in [place] Pt presenting w/ increased confusion for past 8 days since undergoing outpatient treatment for PNA w/ levaquin, since w/ acute worsening for past 3 days following mechanical fall.  - CT head no acute intracranial pathology  - D/C'ed levaquin in setting of low clinical suspicion for bacterial PNA  - restarted patient on carbidopa/levodopa 25/100 2 tabs TID with meals and 25/100 ER 2 tabs at bedtime  - PT evaluated patient, pending [facility]CIANO

## 2022-10-31 NOTE — PROGRESS NOTE ADULT - PROBLEM SELECTOR PLAN 5
F: none  E: replete PRN  N: regular diet  DVT ppx: hold prior to rulling bleed on CTH  GI ppx: not required  Code status: full code Patient with pmhx of Parkinson's Disease  - c/w home carbidopa/levodopa 25/100 2 tabs TID with meals and 25/100 ER* 2 tabs at bedtime

## 2022-10-31 NOTE — DISCHARGE NOTE PROVIDER - NSDCMRMEDTOKEN_GEN_ALL_CORE_FT
carbidopa-levodopa 25 mg-100 mg oral tablet, disintegratin tab(s) orally 3 times a day  carbidopa-levodopa 25 mg-100 mg oral tablet, extended release: 2 tab(s) orally once a day (at bedtime)  escitalopram 10 mg oral tablet: 1 tab(s) orally once a day  losartan 50 mg oral tablet: 1 tab(s) orally once a day   carbidopa-levodopa 25 mg-100 mg oral tablet, disintegratin tab(s) orally 3 times a day  carbidopa-levodopa 25 mg-100 mg oral tablet, extended release: 2 tab(s) orally once a day (at bedtime)  clotrimazole 1% topical cream: 1 application topically every 12 hours  enoxaparin: 40 milligram(s) subcutaneous every 24 hours  escitalopram 10 mg oral tablet: 1 tab(s) orally once a day  losartan 50 mg oral tablet: 1 tab(s) orally once a day  mupirocin 2% topical ointment: 1 application topically every 12 hours

## 2022-10-31 NOTE — PROGRESS NOTE ADULT - SUBJECTIVE AND OBJECTIVE BOX
O/N Events:    Subjective/ROS: Patient seen and examined at bedside.     Denies Fever/Chills, HA, CP, SOB, n/v, changes in bowel/urinary habits.  12pt ROS otherwise negative.    VITALS  Vital Signs Last 24 Hrs  T(C): 36.6 (31 Oct 2022 06:25), Max: 36.9 (30 Oct 2022 22:17)  T(F): 97.8 (31 Oct 2022 06:25), Max: 98.4 (30 Oct 2022 22:17)  HR: 86 (31 Oct 2022 06:59) (86 - 92)  BP: 165/90 (31 Oct 2022 06:59) (157/83 - 175/88)  BP(mean): --  RR: 16 (31 Oct 2022 06:25) (16 - 17)  SpO2: 98% (31 Oct 2022 06:25) (98% - 98%)    Parameters below as of 31 Oct 2022 06:25  Patient On (Oxygen Delivery Method): room air        CAPILLARY BLOOD GLUCOSE          PHYSICAL EXAM  General: NAD  Head: NC/AT; MMM; PERRL; EOMI;  Neck: Supple; no JVD  Respiratory: CTAB; no wheezes/rales/rhonchi  Cardiovascular: Regular rhythm/rate; S1/S2+, no murmurs, rubs gallops   Gastrointestinal: Soft; NTND; bowel sounds normal and present  Extremities: WWP; no edema/cyanosis  Neurological: A&Ox3, CNII-XII grossly intact; no obvious focal deficits    MEDICATIONS  (STANDING):  carbidopa/levodopa 25/100 Disintegrating Tablet 2 Tablet(s) Oral three times a day  carbidopa/levodopa CR 25/100 2 Tablet(s) Oral at bedtime  escitalopram 10 milliGRAM(s) Oral every 24 hours  losartan 25 milliGRAM(s) Oral every 24 hours  melatonin 1 milliGRAM(s) Oral at bedtime    MEDICATIONS  (PRN):      No Known Allergies      LABS                        12.1   7.03  )-----------( 178      ( 31 Oct 2022 06:42 )             37.6     10-31    145  |  106  |  22  ----------------------------<  115<H>  4.0   |  29  |  0.91    Ca    8.8      31 Oct 2022 06:42  Phos  2.5     10-31  Mg     1.8     10-31    TPro  7.1  /  Alb  3.9  /  TBili  0.6  /  DBili  x   /  AST  20  /  ALT  <5<L>  /  AlkPhos  62  10-29        CARDIAC MARKERS ( 30 Oct 2022 15:35 )  x     / 0.04 ng/mL / 909 U/L / x     / 10.5 ng/mL  CARDIAC MARKERS ( 30 Oct 2022 05:30 )  x     / 0.05 ng/mL / x     / x     / x      CARDIAC MARKERS ( 29 Oct 2022 22:45 )  x     / 0.04 ng/mL / x     / x     / x      CARDIAC MARKERS ( 29 Oct 2022 08:36 )  x     / 0.03 ng/mL / 378 U/L / x     / x              IMAGING/EKG/ETC   O/N Events: no acute events     Subjective/ROS: Patient seen and examined at bedside.     Denies Fever/Chills, HA, CP, SOB, n/v, changes in bowel/urinary habits.  12pt ROS otherwise negative.    VITALS  Vital Signs Last 24 Hrs  T(C): 36.6 (31 Oct 2022 06:25), Max: 36.9 (30 Oct 2022 22:17)  T(F): 97.8 (31 Oct 2022 06:25), Max: 98.4 (30 Oct 2022 22:17)  HR: 86 (31 Oct 2022 06:59) (86 - 92)  BP: 165/90 (31 Oct 2022 06:59) (157/83 - 175/88)  BP(mean): --  RR: 16 (31 Oct 2022 06:25) (16 - 17)  SpO2: 98% (31 Oct 2022 06:25) (98% - 98%)    Parameters below as of 31 Oct 2022 06:25  Patient On (Oxygen Delivery Method): room air    CAPILLARY BLOOD GLUCOSE    PHYSICAL EXAM  General: NAD  Head: NC/AT; MMM  Neck: Supple; no JVD  Respiratory: CTAB; no wheezes/rales/rhonchi  Cardiovascular: Regular rhythm/rate; S1/S2+  Gastrointestinal: Soft; NTND; bowel sounds normal and present  Extremities: WWP; no edema/cyanosis  Neurological: A&Ox3, some confabulations and confusion this morning.     MEDICATIONS  (STANDING):  carbidopa/levodopa 25/100 Disintegrating Tablet 2 Tablet(s) Oral three times a day  carbidopa/levodopa CR 25/100 2 Tablet(s) Oral at bedtime  escitalopram 10 milliGRAM(s) Oral every 24 hours  losartan 25 milliGRAM(s) Oral every 24 hours  melatonin 1 milliGRAM(s) Oral at bedtime    MEDICATIONS  (PRN):      No Known Allergies      LABS                        12.1   7.03  )-----------( 178      ( 31 Oct 2022 06:42 )             37.6     10-31    145  |  106  |  22  ----------------------------<  115<H>  4.0   |  29  |  0.91    Ca    8.8      31 Oct 2022 06:42  Phos  2.5     10-31  Mg     1.8     10-31    TPro  7.1  /  Alb  3.9  /  TBili  0.6  /  DBili  x   /  AST  20  /  ALT  <5<L>  /  AlkPhos  62  10-29        CARDIAC MARKERS ( 30 Oct 2022 15:35 )  x     / 0.04 ng/mL / 909 U/L / x     / 10.5 ng/mL  CARDIAC MARKERS ( 30 Oct 2022 05:30 )  x     / 0.05 ng/mL / x     / x     / x      CARDIAC MARKERS ( 29 Oct 2022 22:45 )  x     / 0.04 ng/mL / x     / x     / x      CARDIAC MARKERS ( 29 Oct 2022 08:36 )  x     / 0.03 ng/mL / 378 U/L / x     / x              IMAGING/EKG/ETC

## 2022-10-31 NOTE — PROGRESS NOTE ADULT - PROBLEM SELECTOR PLAN 3
Patient with pmhx of HTN  - c/w home losartan 25mg QD Patient with pmhx of HTN  - increased losartan from 25mg qdaily to 50mg qdaily, will continue to monitor BP Pt w/ recent unwitnessed mechanical fall at home. Otherwise denies head injury, however wife unable to confirm and notes pt seen on the ground with head positioned against the wall. No CTH performed in ED.  - CT head no acute intracranial pathology  - fall risk precautions  - PT recommended [facility], patient and wife would like [facility] in [place]

## 2022-10-31 NOTE — DISCHARGE NOTE PROVIDER - ATTENDING DISCHARGE PHYSICAL EXAMINATION:
General: NAD  Head: NC/AT; MMM  Neck: Supple; no JVD  Respiratory: CTAB; no wheezes/rales/rhonchi  Cardiovascular: Regular rhythm/rate; S1/S2+  Gastrointestinal: Soft; NTND; bowel sounds normal and present  Extremities: WWP; no edema/cyanosis  Neurological: A&Ox3, continued confabulations and confusion this morning at bedside.     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

## 2022-10-31 NOTE — PROGRESS NOTE ADULT - PROBLEM SELECTOR PLAN 1
Pt presenting w/ increased confusion for past 8 days since undergoing outpatient treatment for PNA w/ levaquin, since w/ acute worsening for past 3 days following mechanical fall.  - CT head no acute intracranial pathology  - D/C'ed levaquin in setting of low clinical suspicion for bacterial PNA  - restarted patient on carbidopa/levodopa 25/100 2 tabs TID with meals and 25/100 ER 2 tabs at bedtime Pt presenting w/ increased confusion for past 8 days since undergoing outpatient treatment for PNA w/ levaquin, since w/ acute worsening for past 3 days following mechanical fall.  - CT head no acute intracranial pathology  - D/C'ed levaquin in setting of low clinical suspicion for bacterial PNA  - restarted patient on carbidopa/levodopa 25/100 2 tabs TID with meals and 25/100 ER 2 tabs at bedtime  - PT evaluated patient, pending [facility] Patient with uptrending trops from 0.03 to 0.05, now 0.04 in the setting of HTN with BP of 165/90, could be demand ischemia due to elevated BP.   - increased losartan from 25mg to 50mg qdaily   - trops downtrending now to 0.04  - EKG NSR with some ST abnormalities

## 2022-10-31 NOTE — DISCHARGE NOTE PROVIDER - HOSPITAL COURSE
#Discharge:    88yo man w/ HTN, Parkinsons (follows up at [facility]), recently treated outpatient for PNA x 10days (started 10/21), admitted for increased confusion and poor appeitite for past 8 days    Hospital course:   #Type 2 myocardial infarction due to hypertension  Patient with uptrending trops from 0.03 to 0.05, now 0.04 in the setting of HTN with BP of 165/90, could be demand ischemia due to elevated BP.   - increased losartan from 25mg to 50mg qdaily   - trops downtrending now to 0.04  - EKG NSR with some ST abnormalities.    #Subacute delirium  Pt presenting w/ increased confusion for past 8 days since undergoing outpatient treatment for PNA w/ levaquin, since w/ acute worsening for past 3 days following mechanical fall.  - CT head no acute intracranial pathology  - D/C'ed levaquin in setting of low clinical suspicion for bacterial PNA  - restarted patient on carbidopa/levodopa 25/100 2 tabs TID with meals and 25/100 ER 2 tabs at bedtime  - PT evaluated patient, pending [facility].    #Fall at home  Pt w/ recent unwitnessed mechanical fall at home. Otherwise denies head injury, however wife unable to confirm and notes pt seen on the ground with head positioned against the wall. No CTH performed in ED.  - CT head no acute intracranial pathology  - fall risk precautions  - PT recommended [facility], patient and wife would like [facility] in [place].    #HTN (hypertension)  Patient with pmhx of HTN  - increased losartan from 25mg qdaily to 50mg qdaily, will continue to monitor BP.    #Parkinson disease  Patient with pmhx of Parkinson's Disease  - c/w home carbidopa/levodopa 25/100 2 tabs TID with meals and 25/100 ER* 2 tabs at bedtime.    Patient was discharged to [facility].     New medications: xxx  Changes to old medications: increased losartan to 50mg qdaily  Medications that were stopped: levaquin- please stop taking the levaquin.     Physical exam at the time of discharge:xxx       #Discharge:    86yo man w/ HTN, Parkinsons (follows up at [facility]), recently treated outpatient for PNA x 10days (started 10/21), admitted for increased confusion and poor appeitite for past 8 days    Hospital course:   #Type 2 myocardial infarction due to hypertension  Patient with uptrending trops from 0.03 to 0.05, now 0.04 in the setting of HTN with BP of 165/90, could be demand ischemia due to elevated BP.   - increased losartan from 25mg to 50mg qdaily   - trops downtrending now to 0.04  - EKG NSR with some ST abnormalities.    #Subacute delirium  Pt presenting w/ increased confusion for past 8 days since undergoing outpatient treatment for PNA w/ levaquin, since w/ acute worsening for past 3 days following mechanical fall.  - CT head no acute intracranial pathology  - D/C'ed levaquin in setting of low clinical suspicion for bacterial PNA  - restarted patient on carbidopa/levodopa 25/100 2 tabs TID with meals and 25/100 ER 2 tabs at bedtime  - PT evaluated patient, pending [facility].    #Fall at home  Pt w/ recent unwitnessed mechanical fall at home. Otherwise denies head injury, however wife unable to confirm and notes pt seen on the ground with head positioned against the wall. No CTH performed in ED.  - CT head no acute intracranial pathology  - fall risk precautions  - PT recommended [facility], patient and wife would like [facility] in [place].    #HTN (hypertension)  Patient with pmhx of HTN  - increased losartan from 25mg qdaily to 50mg qdaily, will continue to monitor BP.    #Parkinson disease  Patient with pmhx of Parkinson's Disease  - c/w home carbidopa/levodopa 25/100 2 tabs TID with meals and 25/100 ER* 2 tabs at bedtime.    Patient was discharged to [facility].     New medications: none; will receive IVIG at [facility]   Changes to old medications: increased losartan to 50mg qdaily  Medications that were stopped: levaquin- please stop taking the levaquin.     Physical exam at the time of discharge:xxx       #Discharge:    88yo man w/ HTN, Parkinsons (follows up at [facility]), recently treated outpatient for PNA x 10days (started 10/21), admitted for increased confusion and poor appeitite for past 8 days    Hospital course:   #Subacute delirium  Pt presenting w/ increased confusion for past 8 days since undergoing outpatient treatment for PNA w/ levaquin, since w/ acute worsening for past 3 days following mechanical fall.  - CT head showed no acute intracranial pathology. D/C'ed levaquin in setting of low clinical suspicion for bacterial PNA. Restarted patient on carbidopa/levodopa 25/100 2 tabs TID with meals and 25/100 ER 2 tabs at bedtime. PT evaluated patient, pending [facility].    #Type 2 myocardial infarction due to hypertension  Patient with uptrending trops from 0.03 to 0.05, now 0.04 in the setting of HTN with BP of 165/90, likely demand ischemia due to elevated BP. Increased losartan from 25mg to 50mg qdaily. Trops downtrended to 0.04. EKG showed NSR with some ST abnormalities.    #Fall at home  Pt w/ recent unwitnessed mechanical fall at home. Otherwise denies head injury, however wife unable to confirm and notes pt seen on the ground with head positioned against the wall. No CTH performed in ED. CT head no acute intracranial pathology. PT recommended [facility].     #HTN (hypertension)  Patient with pmhx of HTN. increased losartan from 25mg qdaily to 50mg qdaily with adequate BP control.     #Diabetes  A1c 6.5%. No known hx of diabetes. Patient on sliding scale inpatient.  -f/u outpatient    #Parkinson disease  Patient with pmhx of Parkinson's Disease  - c/w home carbidopa/levodopa 25/100 2 tabs TID with meals and 25/100 ER* 2 tabs at bedtime.    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 Due 11/4/22. Recieves from home nurse 90g IV in one dose over 7.5 hours. Pre-medication with tylenol 325mg and famotidine 10mg.  -IVIG at [facility]    Patient was discharged to [facility].     New medications: none; will receive IVIG at [facility]   Changes to old medications: increased losartan to 50mg qdaily  Medications that were stopped: levaquin- please stop taking the levaquin.

## 2022-11-01 NOTE — PROGRESS NOTE ADULT - PROBLEM SELECTOR PLAN 4
Patient with pmhx of HTN  - increased losartan from 50mg qdaily to 100mg qdaily, starting 11/2, will continue to monitor BP Patient with pmhx of CIDP, follows Dr. James Jamison [phone]. Patient is on Gamunex-C 90g q4 weeks. Last infusion was 10/6, next infusion due Friday 11/4. Can contact Sidra simons [facility] [phone] for more information.     - will try to get patient infusion before he goes to rehab.

## 2022-11-01 NOTE — PROGRESS NOTE ADULT - SUBJECTIVE AND OBJECTIVE BOX
O/N Events: no acute events overnight.     Subjective/ROS: Patient seen and examined at bedside.     Denies Fever/Chills, HA, CP, SOB, n/v, changes in bowel/urinary habits.  12pt ROS otherwise negative.    VITALS  Vital Signs Last 24 Hrs  T(C): 36.6 (01 Nov 2022 06:25), Max: 36.6 (31 Oct 2022 13:31)  T(F): 97.8 (01 Nov 2022 06:25), Max: 97.9 (31 Oct 2022 13:31)  HR: 80 (01 Nov 2022 06:25) (80 - 92)  BP: 160/79 (01 Nov 2022 06:25) (130/78 - 165/86)  BP(mean): --  RR: 19 (01 Nov 2022 06:25) (18 - 19)  SpO2: 96% (01 Nov 2022 06:25) (95% - 97%)    Parameters below as of 01 Nov 2022 06:25  Patient On (Oxygen Delivery Method): room air    CAPILLARY BLOOD GLUCOSE    PHYSICAL EXAM  General: NAD  Head: NC/AT; MMM  Neck: Supple; no JVD  Respiratory: CTAB; no wheezes/rales/rhonchi  Cardiovascular: Regular rhythm/rate; S1/S2+  Gastrointestinal: Soft; NTND; bowel sounds normal and present  Extremities: WWP; no edema/cyanosis  Neurological: A&Ox3, continued confabulations and confusion this morning at bedside. Patient can recall some but not all of previous conversations with medical team.     MEDICATIONS  (STANDING):  carbidopa/levodopa 25/100 Disintegrating Tablet 2 Tablet(s) Oral three times a day  carbidopa/levodopa CR 25/100 2 Tablet(s) Oral at bedtime  escitalopram 10 milliGRAM(s) Oral every 24 hours  losartan 50 milliGRAM(s) Oral daily  melatonin 1 milliGRAM(s) Oral at bedtime    MEDICATIONS  (PRN):      No Known Allergies      LABS                        12.0   6.34  )-----------( 176      ( 01 Nov 2022 05:30 )             37.0     11-01    142  |  105  |  24<H>  ----------------------------<  101<H>  4.1   |  30  |  1.00    Ca    8.6      01 Nov 2022 05:30  Phos  2.9     11-01  Mg     1.9     11-01    TPro  6.9  /  Alb  3.5  /  TBili  0.4  /  DBili  x   /  AST  32  /  ALT  <5<L>  /  AlkPhos  58  11-01      CARDIAC MARKERS ( 30 Oct 2022 15:35 )  x     / 0.04 ng/mL / 909 U/L / x     / 10.5 ng/mL    IMAGING/EKG/ETC

## 2022-11-01 NOTE — PROGRESS NOTE ADULT - PROBLEM SELECTOR PLAN 2
Pt presenting w/ increased confusion for past 8 days since undergoing outpatient treatment for PNA w/ levaquin, since w/ acute worsening for past 3 days following mechanical fall.  - CT head no acute intracranial pathology  - D/C'ed levaquin in setting of low clinical suspicion for bacterial PNA  - restarted patient on carbidopa/levodopa 25/100 2 tabs TID with meals and 25/100 ER 2 tabs at bedtime  - PT evaluated patient, pending [facility]

## 2022-11-01 NOTE — PROGRESS NOTE ADULT - PROBLEM SELECTOR PLAN 5
Patient with pmhx of Parkinson's Disease  - c/w home carbidopa/levodopa 25/100 2 tabs TID with meals and 25/100 ER* 2 tabs at bedtime Patient follows up with Dr. Guaman outpatient for DM. Patient is not on any home meds for diabetes.     - changed diet to consistent carb diet with low sodium as per Dr. Guaman. Additionally, added miSS insulin with q6hr FGS.

## 2022-11-01 NOTE — PROGRESS NOTE ADULT - PROBLEM SELECTOR PLAN 3
Pt w/ recent unwitnessed mechanical fall at home. Otherwise denies head injury, however wife unable to confirm and notes pt seen on the ground with head positioned against the wall. No CTH performed in ED.  - CT head no acute intracranial pathology  - fall risk precautions  - PT recommended [facility], patient and wife would like [facility] in [place] Patient with pmhx of HTN  - increased losartan from 50mg qdaily to 100mg qdaily, starting 11/2, will continue to monitor BP

## 2022-11-01 NOTE — PROGRESS NOTE ADULT - PROBLEM SELECTOR PLAN 1
Patient with uptrending trops from 0.03 to 0.05, now 0.04 in the setting of HTN with BP of 165/90, could be demand ischemia due to elevated BP.   - increased losartan from 50mg qdaily to 100mg qdaily due to BP of 160/79 (11/1), will be increased starting 11/2  - trops downtrending now to 0.04  - EKG NSR with some ST abnormalities

## 2022-11-01 NOTE — PROGRESS NOTE ADULT - PROBLEM SELECTOR PLAN 6
F: none  E: replete PRN  N: regular diet  DVT ppx: hold prior to rulling bleed on CTH  GI ppx: not required  Code status: full code Pt w/ recent unwitnessed mechanical fall at home. Otherwise denies head injury, however wife unable to confirm and notes pt seen on the ground with head positioned against the wall. No CTH performed in ED.  - CT head no acute intracranial pathology  - fall risk precautions  - PT recommended [facility], patient and wife would like [facility] in [place]

## 2022-11-01 NOTE — PROGRESS NOTE ADULT - ASSESSMENT
88yo man w/ HTN, Parkinsons (follows up at [facility]), recently treated outpatient for PNA x 10days (started 10/21), admitted for increased confusion and poor appeitite for past 8 days.

## 2022-11-02 NOTE — PROGRESS NOTE ADULT - ASSESSMENT
88yo man w/ HTN, Parkinsons (follows up at [facility]), recently treated outpatient for PNA x 10days (started 10/21), admitted for increased confusion and poor appeitite for past 8 days.  88yo man w/ HTN, Parkinsons (follows up at [facility]), recently treated outpatient for PNA x 10days (started 10/21), admitted for increased confusion and poor appeitite x8 days.

## 2022-11-02 NOTE — PROGRESS NOTE ADULT - SUBJECTIVE AND OBJECTIVE BOX
JAQUAN KEVIN, 87y, Male  MRN-[id number]  Patient is a 87y old  Male who presents with a chief complaint of AMS (01 Nov 2022 11:56)    OVERNIGHT EVENTS:     SUBJECTIVE:    12 Point ROS Negative unless noted otherwise above.  -------------------------------------------------------------------------------  VITAL SIGNS:  Vital Signs Last 24 Hrs  T(C): 36.6 (02 Nov 2022 06:10), Max: 37.1 (01 Nov 2022 20:13)  T(F): 97.9 (02 Nov 2022 06:10), Max: 98.7 (01 Nov 2022 20:13)  HR: 83 (02 Nov 2022 06:10) (83 - 96)  BP: 151/88 (02 Nov 2022 06:10) (126/65 - 151/88)  RR: 20 (02 Nov 2022 06:10) (18 - 20)  SpO2: 96% (02 Nov 2022 06:10) (96% - 98%) RA    PHYSICAL EXAM:  General: NAD  Head: NC/AT; MMM  Neck: Supple; no JVD  Respiratory: CTAB; no wheezes/rales/rhonchi  Cardiovascular: Regular rhythm/rate; S1/S2+  Gastrointestinal: Soft; NTND; bowel sounds normal and present  Extremities: WWP; no edema/cyanosis  Neurological: A&Ox3, continued confabulations and confusion this morning at bedside. Patient can recall some but not all of previous conversations with medical team.    ALLERGIES:  no Known Allergies    MEDICATIONS  (STANDING):  carbidopa/levodopa 25/100 Disintegrating Tablet 2 Tablet(s) Oral three times a day  carbidopa/levodopa CR 25/100 2 Tablet(s) Oral at bedtime  dextrose 5%. 1000 milliLiter(s) (50 mL/Hr) IV Continuous <Continuous>  dextrose 5%. 1000 milliLiter(s) (100 mL/Hr) IV Continuous <Continuous>  dextrose 50% Injectable 25 Gram(s) IV Push once  dextrose 50% Injectable 12.5 Gram(s) IV Push once  dextrose 50% Injectable 25 Gram(s) IV Push once  escitalopram 10 milliGRAM(s) Oral every 24 hours  glucagon  Injectable 1 milliGRAM(s) IntraMuscular once  insulin lispro (ADMELOG) corrective regimen sliding scale   SubCutaneous Before meals and at bedtime  losartan 100 milliGRAM(s) Oral daily  melatonin 1 milliGRAM(s) Oral at bedtime    MEDICATIONS  (PRN):  dextrose Oral Gel 15 Gram(s) Oral once PRN Blood Glucose LESS THAN 70 milliGRAM(s)/deciliter  -------------------------------------------------------------------------------  LABS:                        12.0   6.34  )-----------( 176      ( 01 Nov 2022 05:30 )             37.0     11-01    142  |  105  |  24<H>  ----------------------------<  101<H>  4.1   |  30  |  1.00    Ca    8.6      01 Nov 2022 05:30  Phos  2.9     11-01  Mg     1.9     11-01    TPro  6.9  /  Alb  3.5  /  TBili  0.4  /  DBili  x   /  AST  32  /  ALT  <5<L>  /  AlkPhos  58  11-01    LIVER FUNCTIONS - ( 01 Nov 2022 05:30 )  Alb: 3.5 g/dL / Pro: 6.9 g/dL / ALK PHOS: 58 U/L / ALT: <5 U/L / AST: 32 U/L / GGT: x           CAPILLARY BLOOD GLUCOSE  POCT Blood Glucose.: 107 mg/dL (01 Nov 2022 21:54)    COVID-19 PCR: NotDetec (28 Oct 2022 12:11)    RADIOLOGY & ADDITIONAL TESTS: Reviewed.   JAQUAN KEVIN, 87y, Male  MRN-[id number]  Patient is a 87y old  Male who presents with a chief complaint of AMS (01 Nov 2022 11:56)    OVERNIGHT EVENTS: PARK     SUBJECTIVE: Patient feels well this morning, no complaints. Patient is amenable to [facility].     12 Point ROS Negative unless noted otherwise above.  -------------------------------------------------------------------------------  VITAL SIGNS:  Vital Signs Last 24 Hrs  T(C): 36.6 (02 Nov 2022 06:10), Max: 37.1 (01 Nov 2022 20:13)  T(F): 97.9 (02 Nov 2022 06:10), Max: 98.7 (01 Nov 2022 20:13)  HR: 83 (02 Nov 2022 06:10) (83 - 96)  BP: 151/88 (02 Nov 2022 06:10) (126/65 - 151/88)  RR: 20 (02 Nov 2022 06:10) (18 - 20)  SpO2: 96% (02 Nov 2022 06:10) (96% - 98%) RA    PHYSICAL EXAM:  General: NAD  Head: NC/AT; MMM  Neck: Supple; no JVD  Respiratory: CTAB; no wheezes/rales/rhonchi  Cardiovascular: Regular rhythm/rate; S1/S2+  Gastrointestinal: Soft; NTND; bowel sounds normal and present  Extremities: WWP; no edema/cyanosis  Neurological: A&Ox3, continued confabulations and confusion this morning at bedside.     ALLERGIES:  no Known Allergies    MEDICATIONS  (STANDING):  carbidopa/levodopa 25/100 Disintegrating Tablet 2 Tablet(s) Oral three times a day  carbidopa/levodopa CR 25/100 2 Tablet(s) Oral at bedtime  dextrose 5%. 1000 milliLiter(s) (50 mL/Hr) IV Continuous <Continuous>  dextrose 5%. 1000 milliLiter(s) (100 mL/Hr) IV Continuous <Continuous>  dextrose 50% Injectable 25 Gram(s) IV Push once  dextrose 50% Injectable 12.5 Gram(s) IV Push once  dextrose 50% Injectable 25 Gram(s) IV Push once  escitalopram 10 milliGRAM(s) Oral every 24 hours  glucagon  Injectable 1 milliGRAM(s) IntraMuscular once  insulin lispro (ADMELOG) corrective regimen sliding scale   SubCutaneous Before meals and at bedtime  losartan 100 milliGRAM(s) Oral daily  melatonin 1 milliGRAM(s) Oral at bedtime    MEDICATIONS  (PRN):  dextrose Oral Gel 15 Gram(s) Oral once PRN Blood Glucose LESS THAN 70 milliGRAM(s)/deciliter  -------------------------------------------------------------------------------  LABS:                        12.0   6.34  )-----------( 176      ( 01 Nov 2022 05:30 )             37.0     11-01    142  |  105  |  24<H>  ----------------------------<  101<H>  4.1   |  30  |  1.00    Ca    8.6      01 Nov 2022 05:30  Phos  2.9     11-01  Mg     1.9     11-01    TPro  6.9  /  Alb  3.5  /  TBili  0.4  /  DBili  x   /  AST  32  /  ALT  <5<L>  /  AlkPhos  58  11-01    LIVER FUNCTIONS - ( 01 Nov 2022 05:30 )  Alb: 3.5 g/dL / Pro: 6.9 g/dL / ALK PHOS: 58 U/L / ALT: <5 U/L / AST: 32 U/L / GGT: x           CAPILLARY BLOOD GLUCOSE  POCT Blood Glucose.: 107 mg/dL (01 Nov 2022 21:54)    COVID-19 PCR: NotDetec (28 Oct 2022 12:11)    RADIOLOGY & ADDITIONAL TESTS: Reviewed.

## 2022-11-02 NOTE — PROGRESS NOTE ADULT - PROBLEM SELECTOR PLAN 4
Patient with pmhx of CIDP, follows Dr. James Jamison [phone]. Patient is on Gamunex-C 90g q4 weeks. Last infusion was 10/6, next infusion due Friday 11/4. Can contact Sidra simons [facility] [phone] for more information.     - will try to get patient infusion before he goes to rehab.

## 2022-11-02 NOTE — PROGRESS NOTE ADULT - PROBLEM SELECTOR PLAN 8
F: none  E: replete PRN  N: regular diet  DVT ppx: hold prior to rulling bleed on CTH  GI ppx: not required  Code status: full code F: none  E: replete PRN  N: regular diet  DVT ppx: lovenox  GI ppx: not required  Code status: full code

## 2022-11-02 NOTE — PROGRESS NOTE ADULT - PROBLEM SELECTOR PLAN 3
Patient with pmhx of HTN  - increased losartan from 50mg qdaily to 100mg qdaily, starting 11/2, will continue to monitor BP

## 2022-11-02 NOTE — PROGRESS NOTE ADULT - PROBLEM SELECTOR PLAN 6
Pt w/ recent unwitnessed mechanical fall at home. Otherwise denies head injury, however wife unable to confirm and notes pt seen on the ground with head positioned against the wall. No CTH performed in ED.  - CT head no acute intracranial pathology  - fall risk precautions  - PT recommended [facility]O, patient and wife would like MARCIANO in [place]

## 2022-11-03 NOTE — PROGRESS NOTE ADULT - PROBLEM SELECTOR PLAN 5
Patient follows up with Dr. Guaman outpatient for DM. Patient is not on any home meds for diabetes.     - changed diet to consistent carb diet with low sodium as per Dr. Guaman. Additionally, added miSS insulin with q6hr FGS.

## 2022-11-03 NOTE — PROGRESS NOTE ADULT - PROBLEM SELECTOR PLAN 2
Pt presenting w/ increased confusion for past 8 days since undergoing outpatient treatment for PNA w/ levaquin, since w/ acute worsening for past 3 days following mechanical fall.  - CT head no acute intracranial pathology  - D/C'ed levaquin in setting of low clinical suspicion for bacterial PNA  - restarted patient on carbidopa/levodopa 25/100 2 tabs TID with meals and 25/100 ER 2 tabs at bedtime  - PT evaluated patient, pending [facility]CI[facility]O RESOLVED  Patient with uptrending trops from 0.03 to 0.05, now 0.04 in the setting of HTN with BP of 165/90, could be demand ischemia due to elevated BP.   - increased losartan from 50mg qdaily to 100mg qdaily due to BP of 160/79 (11/1), will be increased starting 11/2  - trops downtrending now to 0.04  - EKG NSR with some ST abnormalities

## 2022-11-03 NOTE — PROGRESS NOTE ADULT - PROBLEM SELECTOR PLAN 4
Patient with pmhx of CIDP, follows Dr. James Jamison [phone]. Patient is on Gamunex-C 90g q4 weeks. Last infusion was 10/6, next infusion due Friday 11/4. Can contact Sidra simons [facility] [phone] for more information.     - will try to get patient infusion before he goes to rehab. Patient with pmhx of CIDP, follows Dr. James Jamison [phone]. Patient is on Gamunex-C 90g q4 weeks. Last infusion was 10/6, next infusion due Friday 11/4. Can contact Sidra from [facility] [phone] for more information.   -will get infusion at rehab  -per Sidra from [facility]: premedications with tylenol 325mg, loratidine 10mg. IVIG given via IV--90g at once over 7.5 hours

## 2022-11-03 NOTE — PROGRESS NOTE ADULT - PROBLEM SELECTOR PLAN 7
Patient with pmhx of Parkinson's Disease  - c/w home carbidopa/levodopa 25/100 2 tabs TID with meals and 25/100 ER* 2 tabs at bedtime
F: none  E: replete PRN  N: regular diet  DVT ppx: hold prior to rulling bleed on CTH  GI ppx: not required  Code status: full code

## 2022-11-03 NOTE — PROGRESS NOTE ADULT - SUBJECTIVE AND OBJECTIVE BOX
JAQUAN KEVIN, 87y, Male  MRN-[id number]  Patient is a 87y old  Male who presents with a chief complaint of AMS (02 Nov 2022 07:38)    OVERNIGHT EVENTS:     SUBJECTIVE:    12 Point ROS Negative unless noted otherwise above.  -------------------------------------------------------------------------------  VITAL SIGNS:  Vital Signs Last 24 Hrs  T(C): 36.4 (03 Nov 2022 05:55), Max: 36.7 (02 Nov 2022 12:20)  T(F): 97.6 (03 Nov 2022 05:55), Max: 98 (02 Nov 2022 12:20)  HR: 84 (03 Nov 2022 05:55) (84 - 112)  BP: 182/95 (03 Nov 2022 05:55) (134/70 - 185/97)  RR: 18 (03 Nov 2022 05:55) (17 - 18)  SpO2: 98% (03 Nov 2022 05:55) (96% - 98%) RA    PHYSICAL EXAM:  General: NAD, well developed  HEENT: NC/AT; EOMI, PERRLA, anicteric sclera; moist mucosal membranes.  Neck: supple, trachea midline  Cardiovascular: RRR, +S1/S2; NO M/R/G  Respiratory: CTA B/L; no W/R/R  Gastrointestinal: soft, NT/ND; +BSx4  Extremities: WWP; no edema or cyanosis  Vascular: 2+ radial, DP/PT pulses B/L  Neurological: AAOx3; no focal deficits    ALLERGIES:  No Known Allergies    MEDICATIONS  (STANDING):  carbidopa/levodopa 25/100 Disintegrating Tablet 2 Tablet(s) Oral three times a day  carbidopa/levodopa CR 25/100 2 Tablet(s) Oral at bedtime  clotrimazole 1% Cream 1 Application(s) Topical every 12 hours  dextrose 5%. 1000 milliLiter(s) (100 mL/Hr) IV Continuous <Continuous>  dextrose 5%. 1000 milliLiter(s) (50 mL/Hr) IV Continuous <Continuous>  dextrose 50% Injectable 25 Gram(s) IV Push once  dextrose 50% Injectable 12.5 Gram(s) IV Push once  dextrose 50% Injectable 25 Gram(s) IV Push once  enoxaparin Injectable 40 milliGRAM(s) SubCutaneous every 24 hours  escitalopram 10 milliGRAM(s) Oral every 24 hours  glucagon  Injectable 1 milliGRAM(s) IntraMuscular once  insulin lispro (ADMELOG) corrective regimen sliding scale   SubCutaneous Before meals and at bedtime  losartan 100 milliGRAM(s) Oral daily  melatonin 1 milliGRAM(s) Oral at bedtime  mupirocin 2% Ointment 1 Application(s) Topical every 12 hours    MEDICATIONS  (PRN):  dextrose Oral Gel 15 Gram(s) Oral once PRN Blood Glucose LESS THAN 70 milliGRAM(s)/deciliter  -------------------------------------------------------------------------------  LABS:                        11.8   6.26  )-----------( 169      ( 02 Nov 2022 05:30 )             37.7     11-02    146<H>  |  106  |  27<H>  ----------------------------<  106<H>  3.6   |  31  |  0.97    Ca    8.6      02 Nov 2022 05:30  Phos  2.7     11-02  Mg     1.9     11-02    CAPILLARY BLOOD GLUCOSE  POCT Blood Glucose.: 112 mg/dL (03 Nov 2022 08:32)    COVID-19 PCR: NotDetec (28 Oct 2022 12:11)    RADIOLOGY & ADDITIONAL TESTS: Reviewed.   JAQUAN KEVIN, 87y, Male  MRN-[id number]  Patient is a 87y old  Male who presents with a chief complaint of AMS (02 Nov 2022 07:38)    OVERNIGHT EVENTS:     SUBJECTIVE:    12 Point ROS Negative unless noted otherwise above.  -------------------------------------------------------------------------------  VITAL SIGNS:  Vital Signs Last 24 Hrs  T(C): 36.4 (03 Nov 2022 05:55), Max: 36.7 (02 Nov 2022 12:20)  T(F): 97.6 (03 Nov 2022 05:55), Max: 98 (02 Nov 2022 12:20)  HR: 84 (03 Nov 2022 05:55) (84 - 112)  BP: 182/95 (03 Nov 2022 05:55) (134/70 - 185/97)  RR: 18 (03 Nov 2022 05:55) (17 - 18)  SpO2: 98% (03 Nov 2022 05:55) (96% - 98%) RA    PHYSICAL EXAM:  General: NAD, well developed  HEENT: NC/AT; EOMI, PERRLA, anicteric sclera; moist mucosal membranes.  Neck: supple, trachea midline  Cardiovascular: RRR, +S1/S2; NO M/R/G  Respiratory: CTA B/L; no W/R/R  Gastrointestinal: soft, NT/ND; +BSx4  Skin:   Extremities: WWP; no edema or cyanosis  Vascular: 2+ radial, DP/PT pulses B/L  Neurological: AAOx1; no focal deficits. 4/5 strength bilateral lower extremities    ALLERGIES:  No Known Allergies    MEDICATIONS  (STANDING):  carbidopa/levodopa 25/100 Disintegrating Tablet 2 Tablet(s) Oral three times a day  carbidopa/levodopa CR 25/100 2 Tablet(s) Oral at bedtime  clotrimazole 1% Cream 1 Application(s) Topical every 12 hours  dextrose 5%. 1000 milliLiter(s) (100 mL/Hr) IV Continuous <Continuous>  dextrose 5%. 1000 milliLiter(s) (50 mL/Hr) IV Continuous <Continuous>  dextrose 50% Injectable 25 Gram(s) IV Push once  dextrose 50% Injectable 12.5 Gram(s) IV Push once  dextrose 50% Injectable 25 Gram(s) IV Push once  enoxaparin Injectable 40 milliGRAM(s) SubCutaneous every 24 hours  escitalopram 10 milliGRAM(s) Oral every 24 hours  glucagon  Injectable 1 milliGRAM(s) IntraMuscular once  insulin lispro (ADMELOG) corrective regimen sliding scale   SubCutaneous Before meals and at bedtime  losartan 100 milliGRAM(s) Oral daily  melatonin 1 milliGRAM(s) Oral at bedtime  mupirocin 2% Ointment 1 Application(s) Topical every 12 hours    MEDICATIONS  (PRN):  dextrose Oral Gel 15 Gram(s) Oral once PRN Blood Glucose LESS THAN 70 milliGRAM(s)/deciliter  -------------------------------------------------------------------------------  LABS:                        11.8   6.26  )-----------( 169      ( 02 Nov 2022 05:30 )             37.7     11-02    146<H>  |  106  |  27<H>  ----------------------------<  106<H>  3.6   |  31  |  0.97    Ca    8.6      02 Nov 2022 05:30  Phos  2.7     11-02  Mg     1.9     11-02    CAPILLARY BLOOD GLUCOSE  POCT Blood Glucose.: 112 mg/dL (03 Nov 2022 08:32)    COVID-19 PCR: NotDetec (28 Oct 2022 12:11)    RADIOLOGY & ADDITIONAL TESTS: Reviewed.

## 2022-11-03 NOTE — DISCHARGE NOTE NURSING/CASE MANAGEMENT/SOCIAL WORK - NSDCPEFALRISK_GEN_ALL_CORE
For information on Fall & Injury Prevention, visit: [email or web address] OR  [email or web address] OR  https://www.cdc.gov/steadi/patient.html

## 2022-11-03 NOTE — PROGRESS NOTE ADULT - PROBLEM SELECTOR PLAN 1
Patient with uptrending trops from 0.03 to 0.05, now 0.04 in the setting of HTN with BP of 165/90, could be demand ischemia due to elevated BP.   - increased losartan from 50mg qdaily to 100mg qdaily due to BP of 160/79 (11/1), will be increased starting 11/2  - trops downtrending now to 0.04  - EKG NSR with some ST abnormalities Pt presenting w/ increased confusion for past 8 days since undergoing outpatient treatment for PNA w/ levaquin, since w/ acute worsening for past 3 days following mechanical fall.  - CT head no acute intracranial pathology  - D/C'ed levaquin in setting of low clinical suspicion for bacterial PNA  - restarted patient on carbidopa/levodopa 25/100 2 tabs TID with meals and 25/100 ER 2 tabs at bedtime  - PT evaluated patient, pending [facility]O  - patient currently waxes/wanes--between AOx1-AOx3

## 2022-11-03 NOTE — PROGRESS NOTE ADULT - NUTRITIONAL ASSESSMENT
This patient has been assessed with a concern for Malnutrition and has been determined to have a diagnosis/diagnoses of Moderate protein-calorie malnutrition.    This patient is being managed with:   Diet Regular-  Supplement Feeding Modality:  Oral  Ensure Max Cans or Servings Per Day:  1       Frequency:  Two Times a day  Entered: Oct 29 2022  8:55PM    Diet Regular-  Entered: Oct 28 2022  5:12PM    The following pending diet order is being considered for treatment of Moderate protein-calorie malnutrition:null
This patient has been assessed with a concern for Malnutrition and has been determined to have a diagnosis/diagnoses of Moderate protein-calorie malnutrition.    This patient is being managed with:   Diet Regular-  Supplement Feeding Modality:  Oral  Ensure Enlive Cans or Servings Per Day:  1       Frequency:  Three Times a day  Entered: Oct 31 2022  1:18PM    Diet Regular-  Supplement Feeding Modality:  Oral  Ensure Max Cans or Servings Per Day:  1       Frequency:  Two Times a day  Entered: Oct 29 2022  8:55PM    The following pending diet order is being considered for treatment of Moderate protein-calorie malnutrition:null
This patient has been assessed with a concern for Malnutrition and has been determined to have a diagnosis/diagnoses of Moderate protein-calorie malnutrition.    This patient is being managed with:   Diet Regular-  Supplement Feeding Modality:  Oral  Ensure Max Cans or Servings Per Day:  1       Frequency:  Two Times a day  Entered: Oct 29 2022  8:55PM    
This patient has been assessed with a concern for Malnutrition and has been determined to have a diagnosis/diagnoses of Moderate protein-calorie malnutrition.    This patient is being managed with:   Diet Regular-  Supplement Feeding Modality:  Oral  Ensure Max Cans or Servings Per Day:  1       Frequency:  Two Times a day  Entered: Oct 29 2022  8:55PM    
This patient has been assessed with a concern for Malnutrition and has been determined to have a diagnosis/diagnoses of Moderate protein-calorie malnutrition.    This patient is being managed with:   Diet Regular-  Supplement Feeding Modality:  Oral  Ensure Max Cans or Servings Per Day:  1       Frequency:  Two Times a day  Entered: Oct 29 2022  8:55PM    Diet Regular-  Entered: Oct 28 2022  5:12PM    The following pending diet order is being considered for treatment of Moderate protein-calorie malnutrition:null

## 2022-11-03 NOTE — PROGRESS NOTE ADULT - PROBLEM SELECTOR PROBLEM 7
Parkinson disease
Nutrition, metabolism, and development symptoms

## 2022-11-03 NOTE — DISCHARGE NOTE NURSING/CASE MANAGEMENT/SOCIAL WORK - PATIENT PORTAL LINK FT
You can access the FollowMyHealth Patient Portal offered by [facility] by registering at the following website: [email or web address]. By joining [employer]’s FollowMyHealth portal, you will also be able to view your health information using other applications (apps) compatible with our system.

## 2022-11-03 NOTE — PROGRESS NOTE ADULT - PROBLEM SELECTOR PLAN 6
Pt w/ recent unwitnessed mechanical fall at home. Otherwise denies head injury, however wife unable to confirm and notes pt seen on the ground with head positioned against the wall. No CTH performed in ED.  - CT head no acute intracranial pathology  - fall risk precautions  - PT recommended MARCIANO, patient and wife would like MARCIANO in [place]

## 2022-11-03 NOTE — PROGRESS NOTE ADULT - PROVIDER SPECIALTY LIST ADULT
Internal Medicine
Hospitalist
Internal Medicine

## 2022-11-03 NOTE — PROGRESS NOTE ADULT - PROBLEM SELECTOR PLAN 8
Pt states he is now chest pain free.  .  Anticipate discharge at 7pm if site/condition remains stable.       Maicol Morfin, LIDIA   x [phone] F: none  E: replete PRN  N: regular diet  DVT ppx: lovenox  GI ppx: not required  Code status: full code

## 2022-11-03 NOTE — PROGRESS NOTE ADULT - ASSESSMENT
86yo man w/ HTN, Parkinsons (follows up at [facility]), recently treated outpatient for PNA x 10days (started 10/21), admitted for increased confusion and poor appeitite x8 days.

## 2022-12-02 NOTE — ED PROVIDER NOTE - PATIENT PORTAL LINK FT
You can access the FollowMyHealth Patient Portal offered by [facility] by registering at the following website: [email or web address]. By joining [employer]’s FollowMyHealth portal, you will also be able to view your health information using other applications (apps) compatible with our system.

## 2022-12-02 NOTE — ED PROVIDER NOTE - OBJECTIVE STATEMENT
86 yo male with HTN, Parkinsons, presents c/o right inner medial thigh pain x 2 weeks, no pain at rest. pain worse with movement of limb or walking. states pain startly shortly after doing leg exercises at rehab after recent hospitalization to [facility] for pneumonia. no numbness, no weakness, no tingling. no cp/sob. no hx DVT or PE. uses walker at baseline.

## 2022-12-02 NOTE — ED PROVIDER NOTE - PHYSICAL EXAMINATION
CONSTITUTIONAL: Well-appearing; well-nourished; in no apparent distress.   	HEAD: Normocephalic; atraumatic.   	EYES:  conjunctiva and sclera clear  	ENT: normal nose; no rhinorrhea; normal pharynx with no erythema or lesions.   	NECK: Supple; non-tender;   	CARDIOVASCULAR: Normal S1, S2; no murmurs, rubs, or gallops. Regular rate and rhythm.   	RESPIRATORY: Breathing easily; breath sounds clear and equal bilaterally; no wheezes, rhonchi, or rales.  	GI: Soft; non-distended; non-tender  	EXT: meza x 4  RLE: normal appearance, nontender along leg, normal temperature and color of leg. dpi. soft compartments. good ROM joints. no calf tenderness or swelling. similar appearance to LLE.  	SKIN: Normal for age and race; warm; dry; good turgor; no apparent lesions or rash.   	NEURO: A & O x 3; face symmetric; grossly unremarkable.   PSYCHOLOGICAL: The patient’s mood and manner are appropriate.

## 2022-12-02 NOTE — ED ADULT TRIAGE NOTE - CHIEF COMPLAINT QUOTE
walked in with walker and HHA c/o R upper leg/groin pain x 2 weeks. as per pt, he used the gym and after his session, pain started to develop but gradually became worse. denies fall or direct trauma

## 2022-12-02 NOTE — ED ADULT NURSE NOTE - NS ED NURSE RECORD ANOTHER HT AND WT
"Savanah Payton is a 56 year old female who is being evaluated via a billable video visit.      The patient has been notified of following:     \"This video visit will be conducted via a call between you and your physician/provider. We have found that certain health care needs can be provided without the need for an in-person physical exam.  This service lets us provide the care you need with a video conversation.  If a prescription is necessary we can send it directly to your pharmacy.  If lab work is needed we can place an order for that and you can then stop by our lab to have the test done at a later time.    Video visits are billed at different rates depending on your insurance coverage.  Please reach out to your insurance provider with any questions.    If during the course of the call the physician/provider feels a video visit is not appropriate, you will not be charged for this service.\"    Patient has given verbal consent for Video visit? Yes  How would you like to obtain your AVS? MyChart  If you are dropped from the video visit, the video invite should be resent to: Text to cell phone: [phone]  Will anyone else be joining your video visit? No        Video-Visit Details    Type of service:  Video Visit    Video Start Time: 16630  Video End Time: 1700    Originating Location (pt. Location): Home    Distant Location (provider location):  [facility] [place]     Platform used for Video Visit: Maria Antonia Magaña NP      " Yes

## 2022-12-02 NOTE — ED PROVIDER NOTE - CLINICAL SUMMARY MEDICAL DECISION MAKING FREE TEXT BOX
That's fine. right medial thigh pain after starting PT x 1-2 weeks, neurovascularly intact, US neg for DVT, labs reviewed. most likely strain, advised otc pain meds, rest, f/u primary care.

## 2022-12-08 NOTE — ED PROVIDER NOTE - NS ED MD EM SELECTION
Detail Level: Simple
Price (Do Not Change): 0.00
Instructions: This plan will send the code FBSD to the PM system.  DO NOT or CHANGE the price.
47922 Exp Problem Focused - Mod. Complex

## 2023-01-01 ENCOUNTER — NON-APPOINTMENT (OUTPATIENT)
Age: 88
End: 2023-01-01

## 2023-01-01 ENCOUNTER — EMERGENCY (EMERGENCY)
Facility: HOSPITAL | Age: 88
LOS: 1 days | Discharge: HOME | End: 2023-01-01
Admitting: EMERGENCY MEDICINE
Payer: MEDICARE

## 2023-01-01 ENCOUNTER — APPOINTMENT (OUTPATIENT)
Dept: OPHTHALMOLOGY | Facility: CLINIC | Age: 88
End: 2023-01-01
Payer: MEDICARE

## 2023-01-01 ENCOUNTER — APPOINTMENT (OUTPATIENT)
Dept: OPHTHALMOLOGY | Facility: CLINIC | Age: 88
End: 2023-01-01

## 2023-01-01 ENCOUNTER — APPOINTMENT (OUTPATIENT)
Dept: ENDOCRINOLOGY | Facility: CLINIC | Age: 88
End: 2023-01-01
Payer: MEDICARE

## 2023-01-01 VITALS
SYSTOLIC BLOOD PRESSURE: 134 MMHG | TEMPERATURE: 98 F | HEART RATE: 91 BPM | OXYGEN SATURATION: 98 % | HEIGHT: 77 IN | DIASTOLIC BLOOD PRESSURE: 74 MMHG | WEIGHT: 175.93 LBS | RESPIRATION RATE: 15 BRPM

## 2023-01-01 VITALS
HEART RATE: 90 BPM | TEMPERATURE: 98 F | RESPIRATION RATE: 17 BRPM | OXYGEN SATURATION: 99 % | SYSTOLIC BLOOD PRESSURE: 109 MMHG | DIASTOLIC BLOOD PRESSURE: 65 MMHG

## 2023-01-01 VITALS
HEIGHT: 75 IN | TEMPERATURE: 96.6 F | OXYGEN SATURATION: 98 % | WEIGHT: 175 LBS | BODY MASS INDEX: 21.76 KG/M2 | SYSTOLIC BLOOD PRESSURE: 144 MMHG | DIASTOLIC BLOOD PRESSURE: 80 MMHG | HEART RATE: 88 BPM

## 2023-01-01 DIAGNOSIS — G20 PARKINSON'S DISEASE: ICD-10-CM

## 2023-01-01 DIAGNOSIS — E11.9 TYPE 2 DIABETES MELLITUS W/OUT COMPLICATIONS: ICD-10-CM

## 2023-01-01 DIAGNOSIS — E78.00 PURE HYPERCHOLESTEROLEMIA, UNSPECIFIED: ICD-10-CM

## 2023-01-01 DIAGNOSIS — R80.9 PROTEINURIA, UNSPECIFIED: ICD-10-CM

## 2023-01-01 DIAGNOSIS — I10 ESSENTIAL (PRIMARY) HYPERTENSION: ICD-10-CM

## 2023-01-01 DIAGNOSIS — S00.83XA CONTUSION OF OTHER PART OF HEAD, INITIAL ENCOUNTER: ICD-10-CM

## 2023-01-01 DIAGNOSIS — H26.40 UNSPECIFIED SECONDARY CATARACT: Chronic | ICD-10-CM
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
## 2023-01-01 PROCEDURE — 92134 CPTRZ OPH DX IMG PST SGM RTA: CPT

## 2023-01-01 PROCEDURE — 99284 EMERGENCY DEPT VISIT MOD MDM: CPT

## 2023-01-01 PROCEDURE — 92012 INTRM OPH EXAM EST PATIENT: CPT

## 2023-01-01 PROCEDURE — 92201 OPSCPY EXTND RTA DRAW UNI/BI: CPT

## 2023-01-01 PROCEDURE — 99214 OFFICE O/P EST MOD 30 MIN: CPT

## 2023-01-01 PROCEDURE — 70450 CT HEAD/BRAIN W/O DYE: CPT | Mod: 26

## 2023-01-01 RX ORDER — BACITRACIN ZINC 500 UNIT/G
1 OINTMENT IN PACKET (EA) TOPICAL ONCE
Refills: 0 | Status: COMPLETED | OUTPATIENT
Start: 2023-01-01 | End: 2023-01-01

## 2023-01-01 RX ADMIN — Medication 1 APPLICATION(S): at 08:57

## 2023-02-12 PROBLEM — I10 ESSENTIAL HYPERTENSION: Status: ACTIVE | Noted: 2017-10-27

## 2023-02-12 PROBLEM — R80.9 MICROALBUMINURIA: Status: ACTIVE | Noted: 2022-01-01

## 2023-02-12 PROBLEM — E78.00 PURE HYPERCHOLESTEROLEMIA: Status: ACTIVE | Noted: 2017-10-27

## 2023-02-12 PROBLEM — E11.9 TYPE 2 DIABETES MELLITUS: Status: ACTIVE | Noted: 2019-04-19

## 2023-02-12 NOTE — PHYSICAL EXAM
[Alert] : alert [No Acute Distress] : no acute distress [Normal Sclera/Conjunctiva] : normal sclera/conjunctiva [EOMI] : extra ocular movement intact [PERRL] : pupils equal, round and reactive to light [No Proptosis] : no proptosis [No Lid Lag] : no lid lag [Normal Outer Ear/Nose] : the ears and nose were normal in appearance [Normal Hearing] : hearing was normal [No Neck Mass] : no neck mass was observed [No LAD] : no lymphadenopathy [Clear to Auscultation] : lungs were clear to auscultation bilaterally [No Murmurs] : no murmurs [Normal Rate] : heart rate was normal [Regular Rhythm] : with a regular rhythm [Carotids Normal] : carotid pulses were normal with no bruits [No Edema] : no peripheral edema [Not Tender] : non-tender [Soft] : abdomen soft [No HSM] : no hepato-splenomegaly [Normal Supraclavicular Nodes] : no supraclavicular lymphadenopathy [Normal Anterior Cervical Nodes] : no anterior cervical lymphadenopathy [No CVA Tenderness] : no ~M costovertebral angle tenderness [No Joint Swelling] : no joint swelling seen [Normal] : normal [1+] : 1+ in the posterior tibialis [2+] : 2+ in the dorsalis pedis [Vibration Dec.] : diminished vibratory sensation at the level of the toes [Position Sense Dec.] : diminished position sense at the level of the toes [#1 Diminished] : number 1 was diminished [#2 Diminished] : number 2 was diminished [#3 Diminished] : number 3 was diminished [#4 Diminished] : number 4 was diminished [#5 Diminished] : number 5 was diminished [#6 Diminished] : number 6 was diminished [#7 Diminished] : number 7 was diminished [#8 Diminished] : number 8 was diminished [#9 Diminished] : number 9 was diminished [#10 Diminished] : number 10 was diminished [Normal Affect] : the affect was normal [Normal Mood] : the mood was normal [Kyphosis] : no kyphosis present [Acanthosis Nigricans] : no acanthosis nigricans [Foot Ulcers] : no foot ulcers [Delayed in the Right Toes] : normal in the toes [Swelling] : not swollen [Tenderness] : not tender [Erythema] : not erythematous [Delayed in the Left Toes] : normal in the toes [de-identified] : Moderately bradykinetic.  Speech is fairly clear.  Appears distinctly thinner [de-identified] : Dense corneal arcus [de-identified] : Thyroid approximately 20-30 gm in size, firm and lobular in consistency [de-identified] : DP pulses 2+ bilaterally [de-identified] : Unable to rise from a chair without "rocking" forward a few times, and using his hands for assistance.  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  Vibratory sensation absent over the toes and malleoli.  Hip flexor and quadriceps strength 4/5 bilaterally on static testing.  Moderate cogwheeling in both UEs

## 2023-02-12 NOTE — REVIEW OF SYSTEMS
[Fatigue] : fatigue [Decreased Appetite] : decreased appetite [Muscle Weakness] : muscle weakness [Dry Skin] : dry skin [Confusion] : confusion [Difficulty Walking] : difficulty walking [Tremors] : tremors [Pain/Numbness of Digits] : pain/numbness of digits [Poor Balance] : poor balance [Fever] : no fever [Chills] : no chills [Dry Eyes] : no dryness [Blurred Vision] : no blurred vision [Eyes Itch] : no itch [Hearing Loss] : no hearing loss  [Chest Pain] : no chest pain [Leg Claudication] : no leg claudication [Palpitations] : no palpitations [Lower Ext Edema] : no lower extremity edema [Shortness Of Breath] : no shortness of breath [Cough] : no cough [Wheezing] : no wheezing [Nausea] : no nausea [Heartburn] : no heartburn [Diarrhea] : no diarrhea [Polyuria] : no polyuria [Dysuria] : no dysuria [Joint Pain] : no joint pain [Myalgia] : no myalgia  [Joint Stiffness] : no joint stiffness [Ulcer] : no ulcer [Headaches] : no headaches [Depression] : no depression [Anxiety] : no anxiety [Stress] : no stress [Polydipsia] : no polydipsia [Cold Intolerance] : no cold intolerance [Heat Intolerance] : no heat intolerance [Easy Bleeding] : no ~M tendency for easy bleeding [Easy Bruising] : no tendency for easy bruising [FreeTextEntry2] : Has lost 20 lb since his last visit [FreeTextEntry3] : See comments in the HPI re: recent exam.  No longer has blurred vision or floaters in the R eye [FreeTextEntry4] : Still with difficulty swallowing larger pills.  Denies coughing after drinking liquids [FreeTextEntry6] : Has been noting more CERVANTES, even on level ground [FreeTextEntry7] : Has been taking Metamucil every other day for constipation.  Takes Miralax intermittently [FreeTextEntry8] : Nocturia 3X/night.  Difficulty initiating urination [FreeTextEntry9] : Has significant difficulty getting up out of a chair--needs to "rock" back and forth [de-identified] : Gets skin checks every 4 months  [de-identified] : Tremor has been mild and stable

## 2023-02-12 NOTE — HISTORY OF PRESENT ILLNESS
[FreeTextEntry1] : 87-year-old man who is followed for type 2 DM and hypertension.  His diabetes was diagnosed in 2016 when his A1c level (which was being followed because of pre-diabetes) christoph to 6.7%.  HIs glycemic control has been satisfactory on dietary modification alone.  He is also followed for a non-toxic multinodular goiter (with the dominant nodules having been negative on biopsy) and hypercholesterolemia (previously but not currently on statin therapy).  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  Had been diagnosed with PNA by Dr. Wayne's PA and started on Levaquin.  Confusion and lethargy started 2-3 days later, and he fell at home.  Was taken by ambulance to [facility] and transferred to the hospital.  His confusion only partly remitted after 5 days in the hospital--(he does not remember either my or his wife's visits).  Was transferred to [facility] and stayed there for one week. \par His IVIG therapy was interrupted as a result of the hospitalization, and has only now been pre-authorized and reinstated.  Treatment is scheduled for tomorrow.\par He feels that he is now back to baseline, but he also complains that his "baseline" cognitive status continues to worsen.\par He is not getting any formal PT at this point, but does exercises at home.  (Walks 20 laps in his apartment building hallway twice a day)\alexandrea Does not test fingersticks at home, and has not had any testing done by nurses during the past few months because he has been off IVIG infusions.  His main symptom with the interruption of IVIG therapy has been leg weakness--his sensory symptoms have not worsened.  \alexandrea Has lost 20 lb since his last visit, and admits that his appetite has worsened:\par --Breakfast is either a slice of toast or an English muffin\par --Will sometimes skip lunch, otherwise has soup and half a sandwich\par --Protein at supper is "a little piece of chicken"  Main starch at supper is pasta, (though this will often include meat or cheese)\par Saw Dr. Aragon for Retina re-evaluation yesterday--did not require an intra-vitreal injection

## 2023-02-18 NOTE — ED ADULT NURSE NOTE - OBJECTIVE STATEMENT
87y male presents to ED c/o mechanical fall at home. Hx of parkinson's, notes +head strike without loc. Does not take blood thinners.

## 2023-02-18 NOTE — ED ADULT NURSE REASSESSMENT NOTE - NSIMPLEMENTINTERV_GEN_ALL_ED
Implemented All Fall with Harm Risk Interventions:  [place] to call system. Call bell, personal items and telephone within reach. Instruct patient to call for assistance. Room bathroom lighting operational. Non-slip footwear when patient is off stretcher. Physically safe environment: no spills, clutter or unnecessary equipment. Stretcher in lowest position, wheels locked, appropriate side rails in place. Provide visual cue, wrist band, yellow gown, etc. Monitor gait and stability. Monitor for mental status changes and reorient to person, place, and time. Review medications for side effects contributing to fall risk. Reinforce activity limits and safety measures with patient and family. Provide visual clues: red socks.

## 2023-02-18 NOTE — ED PROVIDER NOTE - PATIENT PORTAL LINK FT
You can access the FollowMyHealth Patient Portal offered by [facility] by registering at the following website: [email or web address]. By joining [facility]’s FollowMyHealth portal, you will also be able to view your health information using other applications (apps) compatible with our system.

## 2023-02-18 NOTE — ED PROVIDER NOTE - CLINICAL SUMMARY MEDICAL DECISION MAKING FREE TEXT BOX
86 yo male with hx parkinsons s/p mechanical fall few days ago, head injury, no neuro/motor deficits, superficial wound to right hand cleansed, bacitracin applied and bandaged, wound care discussed, tetanus utd. ct brain neg. wife at bedside. they would like to be discharged home. advised f/u pmd. return precautions discussed. 88 yo male with hx parkinsons s/p mechanical fall few days ago, head injury, no neuro/motor deficits, superficial wound to right hand cleansed, bacitracin applied and bandaged, wound care discussed, tetanus utd. ct brain neg. wife at bedside who lives with patient, offered admission for fall/PT eval potential subacute rehab as patient at risk for falls, they also have no aide at home. they would like to be discharged home. will have SW see patient to offer resources/aide information. advised f/u pmd. return precautions discussed.

## 2023-02-18 NOTE — ED PROVIDER NOTE - NSFOLLOWUPINSTRUCTIONS_ED_ALL_ED_FT
you may take Tylenol 500 mg every 6-8 hours as needed for pain  Rest. Cool compresses.     Follow up with your primary care doctor as soon as possible    Return for any concerning or worsening symptoms.

## 2023-02-18 NOTE — ED ADULT TRIAGE NOTE - CHIEF COMPLAINT QUOTE
PT with history of Parkinson's brought in by EMS after the pt fell off the bed this morning and was unable to get up despite help from his wife. Pt with an avulsion to the right 4th finger. Denies hitting head. Pt with 2 day old abrasion to the forehead from a fall 2 days ago.

## 2023-02-18 NOTE — ED PROVIDER NOTE - PHYSICAL EXAMINATION
CONSTITUTIONAL: Well-appearing; well-nourished; in no apparent distress.   	HEAD: Normocephalic; hematoma to forehead  	EYES:  conjunctiva and sclera clear  	ENT: normal nose; no rhinorrhea; normal pharynx with no erythema or lesions.   	NECK: Supple; non-tender. good ROM neck.   	CARDIOVASCULAR: Normal S1, S2; no murmurs, rubs, or gallops. Regular rate and rhythm.   	RESPIRATORY: Breathing easily; breath sounds clear and equal bilaterally; no wheezes, rhonchi, or rales.  	GI: Soft; non-distended; non-tender. no cvat bilaterally  Back: no midline tenderness or bony stepoffs.   	EXT: LILLY x 4. no hip or pelvis tenderness.   	SKIN: Normal for age and race; warm; dry; good turgor; no apparent lesions or rash.   	NEURO: A & O x 3; face symmetric; grossly unremarkable.   PSYCHOLOGICAL: The patient’s mood and manner are appropriate.

## 2023-02-18 NOTE — ED ADULT NURSE REASSESSMENT NOTE - NS ED NURSE REASSESS COMMENT FT1
wound cleaned and bacitracin applied. wound care education discussed. no other complaints at this time.

## 2023-06-02 ENCOUNTER — NON-APPOINTMENT (OUTPATIENT)
Age: 88
End: 2023-06-02

## 2023-06-12 ENCOUNTER — APPOINTMENT (OUTPATIENT)
Dept: ENDOCRINOLOGY | Facility: CLINIC | Age: 88
End: 2023-06-12

## 2023-06-22 LAB
ALBUMIN SERPL ELPH-MCNC: 4.1 G/DL
ALBUMIN SERPL ELPH-MCNC: 4.2 G/DL
ALP BLD-CCNC: 69 U/L
ALP BLD-CCNC: 93 U/L
ALT SERPL-CCNC: <5 U/L
ALT SERPL-CCNC: <5 U/L
ANION GAP SERPL CALC-SCNC: 10 MMOL/L
ANION GAP SERPL CALC-SCNC: 11 MMOL/L
AST SERPL-CCNC: 11 U/L
AST SERPL-CCNC: 14 U/L
BILIRUB SERPL-MCNC: 0.3 MG/DL
BILIRUB SERPL-MCNC: 0.5 MG/DL
BUN SERPL-MCNC: 10 MG/DL
BUN SERPL-MCNC: 16 MG/DL
CALCIUM SERPL-MCNC: 9 MG/DL
CALCIUM SERPL-MCNC: 9.2 MG/DL
CHLORIDE SERPL-SCNC: 102 MMOL/L
CHLORIDE SERPL-SCNC: 105 MMOL/L
CHOLEST SERPL-MCNC: 133 MG/DL
CHOLEST SERPL-MCNC: 145 MG/DL
CO2 SERPL-SCNC: 28 MMOL/L
CO2 SERPL-SCNC: 29 MMOL/L
CREAT SERPL-MCNC: 0.94 MG/DL
CREAT SERPL-MCNC: 0.97 MG/DL
CREAT SPEC-SCNC: 148 MG/DL
CREAT SPEC-SCNC: 233 MG/DL
EGFR: 76 ML/MIN/1.73M2
EGFR: 79 ML/MIN/1.73M2
ESTIMATED AVERAGE GLUCOSE: 134 MG/DL
ESTIMATED AVERAGE GLUCOSE: 134 MG/DL
GLUCOSE SERPL-MCNC: 123 MG/DL
GLUCOSE SERPL-MCNC: 132 MG/DL
HBA1C MFR BLD HPLC: 6.3 %
HBA1C MFR BLD HPLC: 6.3 %
HDLC SERPL-MCNC: 40 MG/DL
HDLC SERPL-MCNC: 43 MG/DL
LDLC SERPL CALC-MCNC: 79 MG/DL
LDLC SERPL CALC-MCNC: 94 MG/DL
MICROALBUMIN 24H UR DL<=1MG/L-MCNC: 15.6 MG/DL
MICROALBUMIN 24H UR DL<=1MG/L-MCNC: 2 MG/DL
MICROALBUMIN/CREAT 24H UR-RTO: 14 MG/G
MICROALBUMIN/CREAT 24H UR-RTO: 67 MG/G
NONHDLC SERPL-MCNC: 105 MG/DL
NONHDLC SERPL-MCNC: 90 MG/DL
POTASSIUM SERPL-SCNC: 3.9 MMOL/L
POTASSIUM SERPL-SCNC: 4.1 MMOL/L
PROT SERPL-MCNC: 7 G/DL
PROT SERPL-MCNC: 7.9 G/DL
SODIUM SERPL-SCNC: 141 MMOL/L
SODIUM SERPL-SCNC: 144 MMOL/L
T4 FREE SERPL-MCNC: 1 NG/DL
T4 FREE SERPL-MCNC: 1.2 NG/DL
TRIGL SERPL-MCNC: 55 MG/DL
TRIGL SERPL-MCNC: 55 MG/DL
TSH SERPL-ACNC: 1.55 UIU/ML
TSH SERPL-ACNC: 1.8 UIU/ML

## 2023-07-24 NOTE — ED PROVIDER NOTE - CPE EDP ENMT NORM
PT now unable to work with patient tonight. Patient to be admitted to [facility] and PT will work with her in the morning. Notified Rupert AGUDELO. normal...

## 2023-11-06 NOTE — ED ADULT NURSE NOTE - NSFALLRSKHARMRISK_ED_ALL_ED
Received fax from pharmacy requesting refill on pts medication(s). Pt was last seen in office on 6/29/2023  and has a follow up scheduled for 11/7/2023. Will send request to  Dr. Christopher Rain  for authorization.      Requested Prescriptions     Pending Prescriptions Disp Refills    amitriptyline (ELAVIL) 25 MG tablet [Pharmacy Med Name: amitriptyline 25 mg tablet] 90 tablet 3     Sig: Take 1 tablet by mouth nightly no

## 2023-12-01 NOTE — ED ADULT NURSE NOTE - PLAN OF CARE
From: Christie Tavarez  To: Natalie Plaza  Explanation of exam/test

## 2024-04-12 NOTE — PATIENT PROFILE ADULT - NSPROIMPLANTSMEDDEV_GEN_A_NUR
PAST MEDICAL HISTORY:  Attention deficit hyperactivity disorder (ADHD), unspecified ADHD type     History of sleep apnea on CPAP    
None

## 2024-05-27 NOTE — ED PROVIDER NOTE - NSFOLLOWUPINSTRUCTIONS_ED_ALL_ED_FT
MEDICATIONS  (STANDING):  acetaminophen     Tablet .. 650 milliGRAM(s) Oral every 6 hours  albuterol    90 MICROgram(s) HFA Inhaler 2 Puff(s) Inhalation every 6 hours  aMIOdarone    Tablet 200 milliGRAM(s) Oral daily  atorvastatin 80 milliGRAM(s) Oral at bedtime  budesonide 160 MICROgram(s)/formoterol 4.5 MICROgram(s) Inhaler 2 Puff(s) Inhalation two times a day  dextrose 10% Bolus 125 milliLiter(s) IV Bolus once  dextrose 5%. 1000 milliLiter(s) (100 mL/Hr) IV Continuous <Continuous>  dextrose 5%. 1000 milliLiter(s) (50 mL/Hr) IV Continuous <Continuous>  dextrose 50% Injectable 12.5 Gram(s) IV Push once  dextrose 50% Injectable 25 Gram(s) IV Push once  glucagon  Injectable 1 milliGRAM(s) IntraMuscular once  heparin   Injectable 8000 Unit(s) IV Push once  heparin  Infusion.  Unit(s)/Hr (18 mL/Hr) IV Continuous <Continuous>  insulin glargine Injectable (LANTUS) 30 Unit(s) SubCutaneous at bedtime  insulin lispro (ADMELOG) corrective regimen sliding scale   SubCutaneous every 6 hours  insulin lispro Injectable (ADMELOG) 3 Unit(s) SubCutaneous every 6 hours  magnesium oxide 400 milliGRAM(s) Oral daily  metoprolol tartrate 25 milliGRAM(s) Oral two times a day  montelukast 10 milliGRAM(s) Oral at bedtime  piperacillin/tazobactam IVPB. 3.375 Gram(s) IV Intermittent once  piperacillin/tazobactam IVPB.- 3.375 Gram(s) IV Intermittent once  piperacillin/tazobactam IVPB.. 3.375 Gram(s) IV Intermittent every 8 hours  tiotropium 2.5 MICROgram(s) Inhaler 2 Puff(s) Inhalation daily  zinc sulfate 220 milliGRAM(s) Oral daily    MEDICATIONS  (PRN):  dextrose Oral Gel 15 Gram(s) Oral once PRN Blood Glucose LESS THAN 70 milliGRAM(s)/deciliter  heparin   Injectable 4000 Unit(s) IV Push every 6 hours PRN For aPTT between 40 - 57  heparin   Injectable 8000 Unit(s) IV Push every 6 hours PRN For aPTT less than 40  
THE COVID 19 TEST   - results may take up to 5-7 days to become available   - if your result is positive, you will receive a phone call from one of our coronavirus specialists   - negative result may not be communicated on time due to the sheer volume of testing from the ER. If you haven't heard from us within 5 days, you can check FollowPlated [facility] or call [phone] (please do not call the ER for results)    Please continue to self quarantine (home isolation) until your result is back and follow instructions accordingly  - positive: complete home isolation for a total of 14 days since day of testing and no more fever with feeling back to baseline   - negative: you will be able to stop home isolation but still practice standard precautions, similar to how you would manage a regular flu/cold     Return to ER for any worsening symptoms, such as persistent fever >100.4F, shortness of breath, coughing up bloody sputum, chest pain, lethargy, and fainting    Please remember to wash your hands frequently (>20 seconds each time), avoid touching your face, and cover your cough/sneeze    Only take tylenol for fever/pain control and avoid NSAIDs (ibuprofen/advil/aleve/naproxen) due to potential increased risk of exacerbating COVID-19 infection
